# Patient Record
Sex: FEMALE | Race: WHITE | Employment: FULL TIME | URBAN - METROPOLITAN AREA
[De-identification: names, ages, dates, MRNs, and addresses within clinical notes are randomized per-mention and may not be internally consistent; named-entity substitution may affect disease eponyms.]

---

## 2024-05-09 ENCOUNTER — OFFICE VISIT (OUTPATIENT)
Age: 49
End: 2024-05-09
Payer: COMMERCIAL

## 2024-05-09 ENCOUNTER — HOSPITAL ENCOUNTER (OUTPATIENT)
Age: 49
Discharge: HOME | End: 2024-05-09
Payer: COMMERCIAL

## 2024-05-09 VITALS — HEIGHT: 65 IN | BODY MASS INDEX: 47.48 KG/M2 | WEIGHT: 285 LBS

## 2024-05-09 DIAGNOSIS — M25.562 LEFT KNEE PAIN, UNSPECIFIED CHRONICITY: ICD-10-CM

## 2024-05-09 DIAGNOSIS — M25.562 LEFT KNEE PAIN, UNSPECIFIED CHRONICITY: Primary | ICD-10-CM

## 2024-05-09 DIAGNOSIS — S83.282A ACUTE LATERAL MENISCUS TEAR OF LEFT KNEE, INITIAL ENCOUNTER: Primary | ICD-10-CM

## 2024-05-09 PROCEDURE — G8427 DOCREV CUR MEDS BY ELIG CLIN: HCPCS | Performed by: ORTHOPAEDIC SURGERY

## 2024-05-09 PROCEDURE — G8417 CALC BMI ABV UP PARAM F/U: HCPCS | Performed by: ORTHOPAEDIC SURGERY

## 2024-05-09 PROCEDURE — 99203 OFFICE O/P NEW LOW 30 MIN: CPT | Performed by: ORTHOPAEDIC SURGERY

## 2024-05-09 PROCEDURE — 4004F PT TOBACCO SCREEN RCVD TLK: CPT | Performed by: ORTHOPAEDIC SURGERY

## 2024-05-09 PROCEDURE — 73564 X-RAY EXAM KNEE 4 OR MORE: CPT

## 2024-05-09 RX ORDER — TRIAMTERENE AND HYDROCHLOROTHIAZIDE 37.5; 25 MG/1; MG/1
1 CAPSULE ORAL DAILY
COMMUNITY

## 2024-05-09 RX ORDER — MONTELUKAST SODIUM 10 MG/1
10 TABLET ORAL NIGHTLY
COMMUNITY

## 2024-05-09 RX ORDER — ASPIRIN 81 MG/1
81 TABLET ORAL DAILY
COMMUNITY

## 2024-05-09 RX ORDER — CETIRIZINE HYDROCHLORIDE 10 MG/1
10 TABLET ORAL DAILY
COMMUNITY

## 2024-05-09 RX ORDER — FAMOTIDINE 20 MG/1
20 TABLET, FILM COATED ORAL 2 TIMES DAILY
COMMUNITY

## 2024-05-09 RX ORDER — ACETAMINOPHEN AND CODEINE PHOSPHATE 120; 12 MG/5ML; MG/5ML
1 SOLUTION ORAL DAILY
COMMUNITY

## 2024-05-09 NOTE — PROGRESS NOTES
Date of Encounter: 2024  Patient Name:Yandy Garcia  Medical Record Number: [id number]    Chief Complaint   Patient presents with    New Patient     L knee pain       History of Present Illness:  Yandy Garcia is a 48 y.o. female here for evaluation of her left knee.  Her current symptoms are described below and I reviewed them with her today.  She has sudden onset sharp pain in the posterior lateral aspect of her left knee.  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  She twisted and felt a pain and a pop.  Since then she has had difficulty bending the knee.  She is not sure if she had any underlying swelling but despite use of ice and elevation as well as oral nonsteroidal anti-inflammatories she has had minimal improvement in her symptoms.  She can walk on level ground but as soon as she starts to try to go up or down a step or bend the knee to pushing the clutch on her corner she has severe pain.  She denies numbness or tingling that radiates down the leg.  No history of prior injury or surgery on this knee.    Pain Assessment  Location of Pain: Knee  Location Modifiers: Left  Quality of Pain: Popping  Duration of Pain: Persistent  Frequency of Pain: Constant  Aggravating Factors: Stairs    Past Medical History:   Diagnosis Date    Asthma     GERD (gastroesophageal reflux disease)     Hypertension        Past Surgical History:   Procedure Laterality Date     SECTION         Current Outpatient Medications   Medication Sig Dispense Refill    triamterene-hydroCHLOROthiazide (DYAZIDE) 37.5-25 MG per capsule Take 1 capsule by mouth daily      montelukast (SINGULAIR) 10 MG tablet Take 1 tablet by mouth nightly      norethindrone (MICRONOR) 0.35 MG tablet Take 1 tablet by mouth daily      famotidine (PEPCID) 20 MG tablet Take 1 tablet by mouth 2 times daily      aspirin 81 MG EC tablet Take 1 tablet by mouth daily      cetirizine (ZYRTEC) 10 MG tablet Take 1 tablet by

## 2024-05-14 ENCOUNTER — TELEPHONE (OUTPATIENT)
Age: 49
End: 2024-05-14

## 2024-05-14 ENCOUNTER — HOSPITAL ENCOUNTER (OUTPATIENT)
Age: 49
Discharge: HOME | End: 2024-05-14
Attending: ORTHOPAEDIC SURGERY
Payer: COMMERCIAL

## 2024-05-14 ENCOUNTER — CLINICAL DOCUMENTATION (OUTPATIENT)
Age: 49
End: 2024-05-14

## 2024-05-14 DIAGNOSIS — S83.282A ACUTE LATERAL MENISCUS TEAR OF LEFT KNEE, INITIAL ENCOUNTER: ICD-10-CM

## 2024-05-14 PROCEDURE — 73721 MRI JNT OF LWR EXTRE W/O DYE: CPT

## 2024-05-14 NOTE — TELEPHONE ENCOUNTER
I reviewed the patient's MRI images over the phone with her today.  We discussed the nature of her lateral meniscus tear as well as her underlying early degenerative changes in her knee.  She has been asymptomatic with respect to her anterior compartment chondrosis.  She continues to have mechanical symptoms related to the lateral meniscus tear with twisting and flexion of the knee  We reviewed treatment options including nonoperative intervention with anti-inflammatory medications, possible injection and physical therapy versus arthroscopic meniscus repair.  We discussed the potential for increase in tear size and symptoms without repair.  We reviewed risk of further arthritis in the future and timelines for potential need for total knee arthroplasty.  She knows these or not definitive timelines but only estimates based on clinical data.  She understands there is no guarantee with surgical intervention but this could help delay the need for total joint arthroplasty if meniscal repair is successful.  Reviewed the average recovery rates of meniscal repairs and the success rate of approximately 70% with good healing.  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  We discussed the potential for arthroscopic intervention next week and she will talk this over with her employer and spouse.  Her son has an 8th grade graduation next Tuesday so any surgery would be planned after that date.  [facility] paperwork will be filed.    MRI findings 5/14/24:  EXAM: MRI KNEE LEFT WO CONTRAST      INDICATION: Acute lateral meniscus tear of left knee     COMPARISON: Radiographs 5/9/2024     TECHNIQUE: Multiplanar T1 and T2 weighted MR sequences were obtained of the knee  without contrast.     FINDINGS:     MENISCI  Medial meniscus: Intact  Lateral meniscus: Abnormal signal and morphology of the posterior

## 2024-05-14 NOTE — PROGRESS NOTES
Patient came in today, spoke with patient about everything that needs to be done with Workers Comp - Will talk to patient about setting everything up for worker's comp. Uploaded and sent MEDCO-14 and First Date of Injury to Workers Comp.     Went over everything will Surgery for patient, Will Complete Prep for Proc once we hear back from [facility] about approval Hoping to have Surgery Done 05/22/24.

## 2024-05-16 ENCOUNTER — TELEPHONE (OUTPATIENT)
Age: 49
End: 2024-05-16

## 2024-05-16 NOTE — TELEPHONE ENCOUNTER
Patient Called, Patient informed us that she has a Workers Comp Claim number now Claim Number: [id number]     Dr. Rowley is still planning to do surgery on for this patient 05/22/24 given approval from [facility].    PLEASE ADVISE

## 2024-05-17 ENCOUNTER — TELEPHONE (OUTPATIENT)
Dept: ORTHOPEDIC SURGERY | Age: 49
End: 2024-05-17

## 2024-05-17 ENCOUNTER — TELEPHONE (OUTPATIENT)
Age: 49
End: 2024-05-17

## 2024-05-17 DIAGNOSIS — Z01.818 PRE-OP TESTING: Primary | ICD-10-CM

## 2024-05-17 DIAGNOSIS — Z01.818 PRE-OP TESTING: ICD-10-CM

## 2024-05-17 LAB
ANION GAP SERPL CALCULATED.3IONS-SCNC: 12 MMOL/L (ref 3–16)
BASOPHILS # BLD: 0.1 K/UL (ref 0–0.2)
BASOPHILS NFR BLD: 1.4 %
BUN SERPL-MCNC: 22 MG/DL (ref 7–20)
CALCIUM SERPL-MCNC: 9.5 MG/DL (ref 8.3–10.6)
CHLORIDE SERPL-SCNC: 102 MMOL/L (ref 99–110)
CO2 SERPL-SCNC: 26 MMOL/L (ref 21–32)
CREAT SERPL-MCNC: 0.8 MG/DL (ref 0.6–1.1)
DEPRECATED RDW RBC AUTO: 18 % (ref 12.4–15.4)
EOSINOPHIL # BLD: 0.2 K/UL (ref 0–0.6)
EOSINOPHIL NFR BLD: 2.6 %
GFR SERPLBLD CREATININE-BSD FMLA CKD-EPI: >90 ML/MIN/{1.73_M2}
GLUCOSE SERPL-MCNC: 105 MG/DL (ref 70–99)
HCT VFR BLD AUTO: 34.9 % (ref 36–48)
HGB BLD-MCNC: 11.4 G/DL (ref 12–16)
INR PPP: 0.96 (ref 0.85–1.15)
LYMPHOCYTES # BLD: 1.8 K/UL (ref 1–5.1)
LYMPHOCYTES NFR BLD: 21.3 %
MCH RBC QN AUTO: 23.5 PG (ref 26–34)
MCHC RBC AUTO-ENTMCNC: 32.6 G/DL (ref 31–36)
MCV RBC AUTO: 72.2 FL (ref 80–100)
MONOCYTES # BLD: 0.4 K/UL (ref 0–1.3)
MONOCYTES NFR BLD: 4.7 %
NEUTROPHILS # BLD: 6 K/UL (ref 1.7–7.7)
NEUTROPHILS NFR BLD: 70 %
PLATELET # BLD AUTO: 331 K/UL (ref 135–450)
PMV BLD AUTO: 8.7 FL (ref 5–10.5)
POTASSIUM SERPL-SCNC: 4.2 MMOL/L (ref 3.5–5.1)
PROTHROMBIN TIME: 13 SEC (ref 11.9–14.9)
RBC # BLD AUTO: 4.83 M/UL (ref 4–5.2)
SODIUM SERPL-SCNC: 140 MMOL/L (ref 136–145)
WBC # BLD AUTO: 8.6 K/UL (ref 4–11)

## 2024-05-17 NOTE — TELEPHONE ENCOUNTER
Patient is wanting to know where she can go to complete physical, her primary care does not do worker comp. Please advise.

## 2024-05-17 NOTE — TELEPHONE ENCOUNTER
I called and informed her that she can get her physical at an urgent care facility. Pt verbalized understanding.

## 2024-05-17 NOTE — TELEPHONE ENCOUNTER
Called Ria with workers comp, got more information on BWC and surgeries.     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

## 2024-05-21 ENCOUNTER — TELEPHONE (OUTPATIENT)
Age: 49
End: 2024-05-21

## 2024-05-21 NOTE — TELEPHONE ENCOUNTER
I spoke with pt and she is aware that we are still waiting for ok for surgery.  Yandy stated that she did place a call into them also. She is still waiting on a call back for them. I told her that we will call as soon as we hear something.   Pt understand and verbalized understanding

## 2024-05-24 NOTE — TELEPHONE ENCOUNTER
Called patient, spoke with patient about no information on Workers comp yet. Will update her as we get more information for workers comp.

## 2024-05-29 ENCOUNTER — ANESTHESIA EVENT (OUTPATIENT)
Age: 49
End: 2024-05-29
Payer: COMMERCIAL

## 2024-05-29 ENCOUNTER — TELEPHONE (OUTPATIENT)
Age: 49
End: 2024-05-29

## 2024-05-29 ENCOUNTER — PREP FOR PROCEDURE (OUTPATIENT)
Age: 49
End: 2024-05-29

## 2024-05-29 PROBLEM — S83.282A ACUTE LATERAL MENISCUS TEAR OF LEFT KNEE: Status: ACTIVE | Noted: 2024-05-29

## 2024-05-29 RX ORDER — SODIUM CHLORIDE 9 MG/ML
INJECTION, SOLUTION INTRAVENOUS CONTINUOUS
Status: CANCELLED | OUTPATIENT
Start: 2024-05-29

## 2024-05-29 RX ORDER — SODIUM CHLORIDE 0.9 % (FLUSH) 0.9 %
5-40 SYRINGE (ML) INJECTION PRN
Status: CANCELLED | OUTPATIENT
Start: 2024-05-29

## 2024-05-29 RX ORDER — SODIUM CHLORIDE 0.9 % (FLUSH) 0.9 %
5-40 SYRINGE (ML) INJECTION EVERY 12 HOURS SCHEDULED
Status: CANCELLED | OUTPATIENT
Start: 2024-05-29

## 2024-05-29 RX ORDER — SODIUM CHLORIDE 9 MG/ML
INJECTION, SOLUTION INTRAVENOUS PRN
Status: CANCELLED | OUTPATIENT
Start: 2024-05-29

## 2024-05-29 RX ORDER — ALBUTEROL SULFATE 90 UG/1
AEROSOL, METERED RESPIRATORY (INHALATION)
COMMUNITY
Start: 2007-11-26

## 2024-05-29 RX ORDER — TRANEXAMIC ACID 650 MG/1
1950 TABLET ORAL
Status: CANCELLED | OUTPATIENT
Start: 2024-05-29

## 2024-05-29 RX ORDER — MELOXICAM 7.5 MG/1
7.5 TABLET ORAL ONCE
Status: CANCELLED | OUTPATIENT
Start: 2024-05-29 | End: 2024-05-29

## 2024-05-29 RX ORDER — ACETAMINOPHEN 325 MG/1
1000 TABLET ORAL ONCE
Status: CANCELLED | OUTPATIENT
Start: 2024-05-29 | End: 2024-05-29

## 2024-05-29 RX ORDER — UBIDECARENONE 75 MG
50 CAPSULE ORAL PRN
COMMUNITY

## 2024-05-29 NOTE — PROGRESS NOTES
[facility] PRE-OPERATIVE INSTRUCTIONS       DOS: __5/30/2024__        Pre-Op Instructions     Patients receiving local anesthetic only will arrive one hour prior to the procedure, all other patients will arrive 1.5 hours prior to procedure time.    [x]  A History and Physical will be required within 30 days prior to surgery date. Some patients may require cardiac or pulmonary clearance. H&P will be completed DOS for Endo/colonoscopy patients.     [x]  Reviewed Medical and Surgical history, medication list, confirmed with patient any implants, allergies, bleeding disorders, SHRUTI and reactions to Anesthesia.    [x]  If there is a change in physical condition between now and the day of surgery, please notify your surgeon. This includes a cough, cold, fever, sore throat, nausea, vomiting and diarrhea. Also notify your surgeon if you experience dizziness, shortness of breath or blurred vision.    [x] Reviewed hx of C-Diff, MRSA, VRE and/or recent use of Antibiotics     [x]  All patients having a procedure must have a ride home by a responsible person that is over the age of 18 and ensure it is someone that we can share medical information with. After discharge, a responsible adult needs to stay with you for 24 hours. There is a limit of 2 adult visitors per room.     If unable to secure ride and/or care taker, please contact surgeon's office.      [x]  No alcohol, smoking or marijuana use 24 hours prior to surgery. Any use of recreational drugs must be stopped 5 days prior to surgery.     [x]  NPO after midnight (Any heart, BP, seizure, thyroid and breathing medications are okay to take the morning of surgery with a small sip of water 4 hours prior to procedure).    The morning of surgery, you may brush your teeth, just no swallowing water. Also, NO gum, candy, mints or ice chips.    []  For Colonoscopy's, follow prep-instructions as indicated by physician.     []  Patients with a insulin pump, keep set on

## 2024-05-29 NOTE — TELEPHONE ENCOUNTER
Called patient, spoke with patient, She is good to go for surgery. Will submit everything for surgery for tomrrow 05/30/24.

## 2024-05-30 ENCOUNTER — HOSPITAL ENCOUNTER (OUTPATIENT)
Age: 49
Setting detail: OUTPATIENT SURGERY
Discharge: HOME | End: 2024-05-30
Attending: ORTHOPAEDIC SURGERY | Admitting: ORTHOPAEDIC SURGERY
Payer: COMMERCIAL

## 2024-05-30 ENCOUNTER — ANESTHESIA (OUTPATIENT)
Age: 49
End: 2024-05-30
Payer: COMMERCIAL

## 2024-05-30 ENCOUNTER — TELEPHONE (OUTPATIENT)
Age: 49
End: 2024-05-30

## 2024-05-30 VITALS
SYSTOLIC BLOOD PRESSURE: 156 MMHG | WEIGHT: 285 LBS | HEIGHT: 65 IN | DIASTOLIC BLOOD PRESSURE: 87 MMHG | HEART RATE: 88 BPM | OXYGEN SATURATION: 98 % | BODY MASS INDEX: 47.48 KG/M2 | RESPIRATION RATE: 18 BRPM | TEMPERATURE: 97.7 F

## 2024-05-30 DIAGNOSIS — S83.282D ACUTE LATERAL MENISCUS TEAR OF LEFT KNEE, SUBSEQUENT ENCOUNTER: Primary | ICD-10-CM

## 2024-05-30 PROBLEM — S83.242A ACUTE MEDIAL MENISCUS TEAR OF LEFT KNEE: Status: ACTIVE | Noted: 2024-05-30

## 2024-05-30 LAB — HCG, PREGNANCY URINE (POC): NEGATIVE

## 2024-05-30 PROCEDURE — 3700000001 HC ADD 15 MINUTES (ANESTHESIA): Performed by: ORTHOPAEDIC SURGERY

## 2024-05-30 PROCEDURE — 6360000002 HC RX W HCPCS: Performed by: ORTHOPAEDIC SURGERY

## 2024-05-30 PROCEDURE — 81025 URINE PREGNANCY TEST: CPT

## 2024-05-30 PROCEDURE — 2500000003 HC RX 250 WO HCPCS: Performed by: NURSE ANESTHETIST, CERTIFIED REGISTERED

## 2024-05-30 PROCEDURE — 6360000002 HC RX W HCPCS: Performed by: NURSE ANESTHETIST, CERTIFIED REGISTERED

## 2024-05-30 PROCEDURE — 2720000010 HC SURG SUPPLY STERILE: Performed by: ORTHOPAEDIC SURGERY

## 2024-05-30 PROCEDURE — 2709999900 HC NON-CHARGEABLE SUPPLY: Performed by: ORTHOPAEDIC SURGERY

## 2024-05-30 PROCEDURE — 6370000000 HC RX 637 (ALT 250 FOR IP): Performed by: ANESTHESIOLOGY

## 2024-05-30 PROCEDURE — 2580000003 HC RX 258: Performed by: NURSE ANESTHETIST, CERTIFIED REGISTERED

## 2024-05-30 PROCEDURE — 7100000001 HC PACU RECOVERY - ADDTL 15 MIN: Performed by: ORTHOPAEDIC SURGERY

## 2024-05-30 PROCEDURE — 7100000000 HC PACU RECOVERY - FIRST 15 MIN: Performed by: ORTHOPAEDIC SURGERY

## 2024-05-30 PROCEDURE — 2580000003 HC RX 258: Performed by: ORTHOPAEDIC SURGERY

## 2024-05-30 PROCEDURE — 7100000010 HC PHASE II RECOVERY - FIRST 15 MIN: Performed by: ORTHOPAEDIC SURGERY

## 2024-05-30 PROCEDURE — 3600000014 HC SURGERY LEVEL 4 ADDTL 15MIN: Performed by: ORTHOPAEDIC SURGERY

## 2024-05-30 PROCEDURE — A4217 STERILE WATER/SALINE, 500 ML: HCPCS | Performed by: ORTHOPAEDIC SURGERY

## 2024-05-30 PROCEDURE — 6370000000 HC RX 637 (ALT 250 FOR IP): Performed by: ORTHOPAEDIC SURGERY

## 2024-05-30 PROCEDURE — 94640 AIRWAY INHALATION TREATMENT: CPT

## 2024-05-30 PROCEDURE — 3700000000 HC ANESTHESIA ATTENDED CARE: Performed by: ORTHOPAEDIC SURGERY

## 2024-05-30 PROCEDURE — 7100000011 HC PHASE II RECOVERY - ADDTL 15 MIN: Performed by: ORTHOPAEDIC SURGERY

## 2024-05-30 PROCEDURE — 3600000004 HC SURGERY LEVEL 4 BASE: Performed by: ORTHOPAEDIC SURGERY

## 2024-05-30 RX ORDER — OXYCODONE HYDROCHLORIDE 5 MG/1
5 TABLET ORAL
Status: DISCONTINUED | OUTPATIENT
Start: 2024-05-30 | End: 2024-05-30 | Stop reason: HOSPADM

## 2024-05-30 RX ORDER — ONDANSETRON 2 MG/ML
INJECTION INTRAMUSCULAR; INTRAVENOUS PRN
Status: DISCONTINUED | OUTPATIENT
Start: 2024-05-30 | End: 2024-05-30 | Stop reason: SDUPTHER

## 2024-05-30 RX ORDER — ASPIRIN 81 MG/1
81 TABLET ORAL 2 TIMES DAILY
Qty: 30 TABLET | Refills: 0
Start: 2024-05-30 | End: 2024-06-14

## 2024-05-30 RX ORDER — SUCCINYLCHOLINE/SOD CL,ISO/PF 200MG/10ML
SYRINGE (ML) INTRAVENOUS PRN
Status: DISCONTINUED | OUTPATIENT
Start: 2024-05-30 | End: 2024-05-30 | Stop reason: SDUPTHER

## 2024-05-30 RX ORDER — SODIUM CHLORIDE 9 MG/ML
INJECTION, SOLUTION INTRAVENOUS PRN
Status: DISCONTINUED | OUTPATIENT
Start: 2024-05-30 | End: 2024-05-30 | Stop reason: HOSPADM

## 2024-05-30 RX ORDER — DROPERIDOL 2.5 MG/ML
0.62 INJECTION, SOLUTION INTRAMUSCULAR; INTRAVENOUS
Status: DISCONTINUED | OUTPATIENT
Start: 2024-05-30 | End: 2024-05-30 | Stop reason: HOSPADM

## 2024-05-30 RX ORDER — TRANEXAMIC ACID 650 MG/1
1950 TABLET ORAL
Status: DISCONTINUED | OUTPATIENT
Start: 2024-05-30 | End: 2024-05-30 | Stop reason: HOSPADM

## 2024-05-30 RX ORDER — ONDANSETRON 2 MG/ML
4 INJECTION INTRAMUSCULAR; INTRAVENOUS
Status: DISCONTINUED | OUTPATIENT
Start: 2024-05-30 | End: 2024-05-30 | Stop reason: HOSPADM

## 2024-05-30 RX ORDER — DEXAMETHASONE SODIUM PHOSPHATE 10 MG/ML
8 INJECTION, SOLUTION INTRAMUSCULAR; INTRAVENOUS ONCE
Status: COMPLETED | OUTPATIENT
Start: 2024-05-30 | End: 2024-05-30

## 2024-05-30 RX ORDER — MIDAZOLAM HYDROCHLORIDE 1 MG/ML
INJECTION INTRAMUSCULAR; INTRAVENOUS PRN
Status: DISCONTINUED | OUTPATIENT
Start: 2024-05-30 | End: 2024-05-30 | Stop reason: SDUPTHER

## 2024-05-30 RX ORDER — DEXAMETHASONE SODIUM PHOSPHATE 4 MG/ML
INJECTION, SOLUTION INTRA-ARTICULAR; INTRALESIONAL; INTRAMUSCULAR; INTRAVENOUS; SOFT TISSUE PRN
Status: DISCONTINUED | OUTPATIENT
Start: 2024-05-30 | End: 2024-05-30 | Stop reason: SDUPTHER

## 2024-05-30 RX ORDER — NALOXONE HYDROCHLORIDE 0.4 MG/ML
INJECTION, SOLUTION INTRAMUSCULAR; INTRAVENOUS; SUBCUTANEOUS PRN
Status: DISCONTINUED | OUTPATIENT
Start: 2024-05-30 | End: 2024-05-30 | Stop reason: HOSPADM

## 2024-05-30 RX ORDER — SODIUM CHLORIDE 0.9 % (FLUSH) 0.9 %
5-40 SYRINGE (ML) INJECTION PRN
Status: DISCONTINUED | OUTPATIENT
Start: 2024-05-30 | End: 2024-05-30 | Stop reason: HOSPADM

## 2024-05-30 RX ORDER — ACETAMINOPHEN 500 MG
1000 TABLET ORAL ONCE
Status: COMPLETED | OUTPATIENT
Start: 2024-05-30 | End: 2024-05-30

## 2024-05-30 RX ORDER — SODIUM CHLORIDE 0.9 % (FLUSH) 0.9 %
5-40 SYRINGE (ML) INJECTION EVERY 12 HOURS SCHEDULED
Status: DISCONTINUED | OUTPATIENT
Start: 2024-05-30 | End: 2024-05-30 | Stop reason: HOSPADM

## 2024-05-30 RX ORDER — MEPERIDINE HYDROCHLORIDE 25 MG/ML
12.5 INJECTION INTRAMUSCULAR; INTRAVENOUS; SUBCUTANEOUS EVERY 5 MIN PRN
Status: DISCONTINUED | OUTPATIENT
Start: 2024-05-30 | End: 2024-05-30 | Stop reason: HOSPADM

## 2024-05-30 RX ORDER — IPRATROPIUM BROMIDE AND ALBUTEROL SULFATE 2.5; .5 MG/3ML; MG/3ML
1 SOLUTION RESPIRATORY (INHALATION)
Status: DISCONTINUED | OUTPATIENT
Start: 2024-05-30 | End: 2024-05-30

## 2024-05-30 RX ORDER — HYDROCODONE BITARTRATE AND ACETAMINOPHEN 5; 325 MG/1; MG/1
1 TABLET ORAL EVERY 6 HOURS PRN
Qty: 20 TABLET | Refills: 0 | Status: SHIPPED | OUTPATIENT
Start: 2024-05-30 | End: 2024-06-04

## 2024-05-30 RX ORDER — PROMETHAZINE HYDROCHLORIDE 25 MG/1
25 TABLET ORAL EVERY 6 HOURS PRN
Qty: 15 TABLET | Refills: 0 | Status: SHIPPED | OUTPATIENT
Start: 2024-05-30 | End: 2024-06-06

## 2024-05-30 RX ORDER — SODIUM CHLORIDE 9 MG/ML
INJECTION, SOLUTION INTRAVENOUS CONTINUOUS PRN
Status: DISCONTINUED | OUTPATIENT
Start: 2024-05-30 | End: 2024-05-30 | Stop reason: SDUPTHER

## 2024-05-30 RX ORDER — SODIUM CHLORIDE 9 MG/ML
INJECTION, SOLUTION INTRAVENOUS CONTINUOUS
Status: DISCONTINUED | OUTPATIENT
Start: 2024-05-30 | End: 2024-05-30 | Stop reason: HOSPADM

## 2024-05-30 RX ORDER — FENTANYL CITRATE 50 UG/ML
INJECTION, SOLUTION INTRAMUSCULAR; INTRAVENOUS PRN
Status: DISCONTINUED | OUTPATIENT
Start: 2024-05-30 | End: 2024-05-30 | Stop reason: SDUPTHER

## 2024-05-30 RX ORDER — MAGNESIUM HYDROXIDE 1200 MG/15ML
LIQUID ORAL CONTINUOUS PRN
Status: COMPLETED | OUTPATIENT
Start: 2024-05-30 | End: 2024-05-30

## 2024-05-30 RX ORDER — BUPIVACAINE HYDROCHLORIDE 2.5 MG/ML
INJECTION, SOLUTION EPIDURAL; INFILTRATION; INTRACAUDAL PRN
Status: DISCONTINUED | OUTPATIENT
Start: 2024-05-30 | End: 2024-05-30 | Stop reason: ALTCHOICE

## 2024-05-30 RX ORDER — FENTANYL CITRATE 50 UG/ML
25 INJECTION, SOLUTION INTRAMUSCULAR; INTRAVENOUS EVERY 5 MIN PRN
Status: DISCONTINUED | OUTPATIENT
Start: 2024-05-30 | End: 2024-05-30 | Stop reason: HOSPADM

## 2024-05-30 RX ORDER — MELOXICAM 7.5 MG/1
7.5 TABLET ORAL ONCE
Status: COMPLETED | OUTPATIENT
Start: 2024-05-30 | End: 2024-05-30

## 2024-05-30 RX ORDER — PROPOFOL 10 MG/ML
INJECTION, EMULSION INTRAVENOUS PRN
Status: DISCONTINUED | OUTPATIENT
Start: 2024-05-30 | End: 2024-05-30 | Stop reason: SDUPTHER

## 2024-05-30 RX ORDER — FAMOTIDINE 10 MG/ML
INJECTION, SOLUTION INTRAVENOUS PRN
Status: DISCONTINUED | OUTPATIENT
Start: 2024-05-30 | End: 2024-05-30 | Stop reason: SDUPTHER

## 2024-05-30 RX ORDER — LIDOCAINE HYDROCHLORIDE 20 MG/ML
INJECTION, SOLUTION INTRAVENOUS PRN
Status: DISCONTINUED | OUTPATIENT
Start: 2024-05-30 | End: 2024-05-30 | Stop reason: SDUPTHER

## 2024-05-30 RX ORDER — ROCURONIUM BROMIDE 10 MG/ML
INJECTION, SOLUTION INTRAVENOUS PRN
Status: DISCONTINUED | OUTPATIENT
Start: 2024-05-30 | End: 2024-05-30 | Stop reason: SDUPTHER

## 2024-05-30 RX ORDER — IPRATROPIUM BROMIDE AND ALBUTEROL SULFATE 2.5; .5 MG/3ML; MG/3ML
1 SOLUTION RESPIRATORY (INHALATION) EVERY 4 HOURS PRN
Status: DISCONTINUED | OUTPATIENT
Start: 2024-05-30 | End: 2024-05-30 | Stop reason: HOSPADM

## 2024-05-30 RX ADMIN — Medication 160 MG: at 07:36

## 2024-05-30 RX ADMIN — MELOXICAM 7.5 MG: 7.5 TABLET ORAL at 06:45

## 2024-05-30 RX ADMIN — ACETAMINOPHEN 1000 MG: 500 TABLET ORAL at 06:44

## 2024-05-30 RX ADMIN — PROPOFOL 200 MG: 10 INJECTION, EMULSION INTRAVENOUS at 07:36

## 2024-05-30 RX ADMIN — SODIUM CHLORIDE: 9 INJECTION, SOLUTION INTRAVENOUS at 07:29

## 2024-05-30 RX ADMIN — FAMOTIDINE 20 MG: 10 INJECTION, SOLUTION INTRAVENOUS at 07:45

## 2024-05-30 RX ADMIN — ROCURONIUM BROMIDE 10 MG: 10 INJECTION, SOLUTION INTRAVENOUS at 07:36

## 2024-05-30 RX ADMIN — TRANEXAMIC ACID 1950 MG: 650 TABLET ORAL at 06:45

## 2024-05-30 RX ADMIN — MIDAZOLAM 2 MG: 1 INJECTION INTRAMUSCULAR; INTRAVENOUS at 07:30

## 2024-05-30 RX ADMIN — HYDROMORPHONE HYDROCHLORIDE 1 MG: 1 INJECTION, SOLUTION INTRAMUSCULAR; INTRAVENOUS; SUBCUTANEOUS at 08:34

## 2024-05-30 RX ADMIN — FENTANYL CITRATE 100 MCG: 50 INJECTION INTRAMUSCULAR; INTRAVENOUS at 07:33

## 2024-05-30 RX ADMIN — DEXTROSE MONOHYDRATE 3000 MG: 5 INJECTION INTRAVENOUS at 07:39

## 2024-05-30 RX ADMIN — IPRATROPIUM BROMIDE AND ALBUTEROL SULFATE 1 DOSE: 2.5; .5 SOLUTION RESPIRATORY (INHALATION) at 09:23

## 2024-05-30 RX ADMIN — ROCURONIUM BROMIDE 40 MG: 10 INJECTION, SOLUTION INTRAVENOUS at 07:39

## 2024-05-30 RX ADMIN — DEXAMETHASONE SODIUM PHOSPHATE 10 MG: 4 INJECTION, SOLUTION INTRA-ARTICULAR; INTRALESIONAL; INTRAMUSCULAR; INTRAVENOUS; SOFT TISSUE at 07:45

## 2024-05-30 RX ADMIN — SODIUM CHLORIDE: 9 INJECTION, SOLUTION INTRAVENOUS at 06:50

## 2024-05-30 RX ADMIN — DEXAMETHASONE SODIUM PHOSPHATE 8 MG: 10 INJECTION, SOLUTION INTRAMUSCULAR; INTRAVENOUS at 06:45

## 2024-05-30 RX ADMIN — LIDOCAINE HYDROCHLORIDE 100 MG: 20 INJECTION, SOLUTION INTRAVENOUS at 07:36

## 2024-05-30 RX ADMIN — SUGAMMADEX 400 MG: 100 INJECTION, SOLUTION INTRAVENOUS at 08:31

## 2024-05-30 RX ADMIN — ONDANSETRON 4 MG: 2 INJECTION INTRAMUSCULAR; INTRAVENOUS at 07:45

## 2024-05-30 RX ADMIN — PROPOFOL 100 MG: 10 INJECTION, EMULSION INTRAVENOUS at 07:41

## 2024-05-30 ASSESSMENT — PAIN - FUNCTIONAL ASSESSMENT: PAIN_FUNCTIONAL_ASSESSMENT: 0-10

## 2024-05-30 NOTE — PROGRESS NOTES
Pt received from OR to PACU # 8.     Post: Procedure(s):  LEFT PARTIAL LATERAL MENISCETOMY ARTHROSCOPIC    Left leg with ACE wrap clean, dry and intact. Distal pedal pulses strong.      Report received from CRNA and OR RN.    Pt is not fully wakeful from anesthesia although arousable to voice. Pt opening eyes and oral airway removed with CRNA at bedside.    Attached to PACU monitoring system. Alarms and parameters set    Denies pain and no complaints of nausea at this time.

## 2024-05-30 NOTE — OP NOTE
Operative Note      Patient: Yandy Garcia  YOB: 1975  MRN: [id number]    Date of Procedure: 5/30/2024    Pre-Op Diagnosis Codes:     * Acute lateral meniscus tear of left knee [S83.282A]  Acute medial meniscus tear of anterior horn left knee     Post-Op Diagnosis: Same       Procedure(s):  LEFT PARTIAL LATERAL MENISCETOMY ARTHROSCOPIC  Left partial medial meniscectomy arthorscopic   Surgeon(s):  Stefano Rowley MD    Assistant:   Surgical Assistant: Christel Chanel    Anesthesia: General    Estimated Blood Loss (mL): Minimal    Complications: None    Specimens:   * No specimens in log *    Implants:  * No implants in log *      Drains: * No LDAs found *    Findings:  Infection Present At Time Of Surgery (PATOS) (choose all levels that have infection present):  No infection present  Other Findings: 50% partial thickness tear of the posterior root of the lateral meniscus, horizontal cleavage tear of the anterior horn of the medial meniscus affecting the superior limb.  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  Intact ACL and PCL.    Condition:  Stable    Weight Bearing Status:  Weight bearing as tolerated    Activity:  Activity as tolerated (Patient may move about with assist as indicated or with supervision.)    Operative Report:  Indications: Yandy Garcia is a 49 y.o. female with history of left knee symptomatic lateral meniscal tear from a work related injury.  This has been confirmed by MRI and her symptoms have note resolved with conservative treatment.  The option of arthroscopic intervention has been presented and she has elected to proceed.  I have reviewed with her the risk, benefits, and potential outcomes / complications and she is prepared to proceed.  Her consent was obtained and all questions answered to her

## 2024-05-30 NOTE — ANESTHESIA PRE PROCEDURE
Cardiovascular:  Exercise tolerance: good (>4 METS)  (+) hypertension:    (-) past MI, CAD,  angina and  BOYD      Rhythm: regular  Rate: normal                 ROS comment: TTE 2018:  Study Conclusions     - Left ventricle: The cavity size was normal. There was mild     concentric hypertrophy. The estimated ejection fraction was in     the range of 60% to 65%. Wall motion was normal; there were no     regional wall motion abnormalities. Diastolic dysfunction:     Indeterminant. The global longitudinal strain was -23%.   - Mitral valve: The annulus was mildly calcified. The leaflets were     mildly thickened.   - Left atrium: The atrium was mildly to moderately dilated.   - Inferior vena cava: The vessel was normal in size; the     respirophasic diameter changes were blunted (< 50%); findings are     consistent with mildly elevated central venous pressure.        Neuro/Psych:   Negative Neuro/Psych ROS              GI/Hepatic/Renal:   (+) GERD:, morbid obesity     (-) liver disease and no renal disease       Endo/Other:                      ROS comment: Goiter 1 x 0.7 x 0.7 per ultrasound in 2018 Abdominal:             Vascular: negative vascular ROS.         Other Findings: Palpable small right thyroid mass            Anesthesia Plan      general     ASA 3     (Asthma is intermittent and states more allergic/seasonal and uses singulair daily. Denies heartburn or nausea this AM. 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 Goiter does not appear to be significant. It is palpable but does not cause issues per patient.)  Induction: intravenous.    MIPS: Postoperative opioids intended, Prophylactic antiemetics administered and Postoperative trial extubation.  Anesthetic plan and risks discussed with patient.      Plan discussed with CRNA.                    Bhavesh Johnson MD   5/30/2024

## 2024-05-30 NOTE — BRIEF OP NOTE
Brief Postoperative Note      Patient: Yandy Garcia  YOB: 1975  MRN: [id number]    Date of Procedure: 5/30/2024    Pre-Op Diagnosis Codes:     * Acute lateral meniscus tear of left knee [S83.282A]  Acute medial meniscus tear of anterior horn left knee     Post-Op Diagnosis: Same       Procedure(s):  LEFT PARTIAL LATERAL MENISCETOMY ARTHROSCOPIC  Left partial medial meniscectomy arthorscopic    Surgeon(s):  Kurt Neff MD    Assistant:  Surgical Assistant: Christel Chanel    Anesthesia: General    Estimated Blood Loss (mL): Minimal    Complications: None    Specimens:   * No specimens in log *    Implants:  * No implants in log *      Drains: * No LDAs found *    Findings:  Infection Present At Time Of Surgery (PATOS) (choose all levels that have infection present):  No infection present  Other Findings: 50% partial thickness tear of the posterior root of the lateral meniscus, horizontal cleavage tear of the anterior horn of the medial meniscus affecting the superior limb.  Diffuse grade III chondromalacia of the trochlea and patella, grade III chondromalacia medial femoral condyle weightbearing aspect and lateral aspect of the weightbearing portion of the lateral femoral condyle.  Intact ACL and PCL.    Electronically signed by KURT NEFF MD on 5/30/2024 at 8:50 AM

## 2024-05-30 NOTE — PROGRESS NOTES
Ambulatory Surgery/Procedure Discharge Note    Vitals:    05/30/24 0945   BP: (!) 156/87   Pulse: 88   Resp: 18   Temp: 97.7 °F (36.5 °C)   SpO2: 98%       In: 1000 [I.V.:900]  Out: 20     Pain assessment:  none  Pain Level: 0      Pt states \"ready to go home\". Pt alert and oriented x4. IV removed. Denies N/V, tolerating PO intake Discharge instructions given to pt and  , verbalized understanding of all instructions. Left with all belongings and discharge instructions.   Patient discharged to home/self care. Patient discharged via wheel chair by RN to waiting family.       5/30/2024 10:17 AM

## 2024-05-30 NOTE — H&P
Patient seen and examined.  I have reviewed the history and physical scanned in from 5/23/2024 and examined the patient and find no relevant changes.  Surgery plan reviewed.    Site marked and consent verified.  All questions answered and antibiotic verified.    Ready for left knee arthroscopy with lateral meniscus repair.      Stefano Rowley MD, FAAOS  Board Certified Orthopaedic Surgeon  [facility]  [place] & [place]    Phone:[phone]  Fax [phone]    05/30/24  7:13 AM

## 2024-05-30 NOTE — DISCHARGE INSTRUCTIONS
machinery while taking narcotics.  Females of childbearing potential and on hormonal birth control, should use two forms of contraception following procedure if given a medication called sugammadex and/or emend. Additional contraception should be used for 7 days for sugammadex and/or 28 days for emend. These medications have a potential to reduce the effectiveness of hormonal birth control.

## 2024-05-30 NOTE — ANESTHESIA POSTPROCEDURE EVALUATION
Department of Anesthesiology  Postprocedure Note    Patient: Yandy Garcia  MRN: [id number]  YOB: 1975  Date of evaluation: 5/30/2024    Procedure Summary       Date: 05/30/24 Room / Location: [address]    Anesthesia Start: 0730 Anesthesia Stop: 0901    Procedure: LEFT PARTIAL LATERAL MENISCETOMY ARTHROSCOPIC (Left: Knee) Diagnosis:       Acute lateral meniscus tear of left knee      (Acute lateral meniscus tear of left knee [S83.282A])    Surgeons: Stefano Rowley MD Responsible Provider: Bhavesh Johnson MD    Anesthesia Type: general ASA Status: 3            Anesthesia Type: No value filed.    John Phase I: John Score: 10    John Phase II: John Score: 10    Anesthesia Post Evaluation    Patient location during evaluation: PACU  Patient participation: complete - patient participated  Level of consciousness: awake  Airway patency: patent  Nausea & Vomiting: no nausea and no vomiting  Cardiovascular status: blood pressure returned to baseline  Respiratory status: acceptable  Hydration status: stable  Comments: Vital signs stable  OK to discharge from Stage I post anesthesia care.  Care transferred from Anesthesiology department on discharge from perioperative area   Multimodal analgesia pain management approach  Pain management: satisfactory to patient    No notable events documented.

## 2024-06-04 ENCOUNTER — TELEPHONE (OUTPATIENT)
Age: 49
End: 2024-06-04

## 2024-06-04 NOTE — TELEPHONE ENCOUNTER
MEDCO14 / WORKABILITY:    Caller: GERARDO AT [facility]    Phone#: [phone]    Fax#: [phone]    MEDCO/WORKABILITY Date of Service:      REASON FOR CALL:         Requesting an updated Medco 14,  medco from 5- is expiring today 6-4-2024

## 2024-06-13 ENCOUNTER — OFFICE VISIT (OUTPATIENT)
Age: 49
End: 2024-06-13

## 2024-06-13 VITALS — WEIGHT: 285 LBS | BODY MASS INDEX: 47.48 KG/M2 | HEIGHT: 65 IN

## 2024-06-13 DIAGNOSIS — S83.282D ACUTE LATERAL MENISCUS TEAR OF LEFT KNEE, SUBSEQUENT ENCOUNTER: Primary | ICD-10-CM

## 2024-06-14 ENCOUNTER — TELEPHONE (OUTPATIENT)
Dept: ORTHOPEDIC SURGERY | Age: 49
End: 2024-06-14

## 2024-06-14 NOTE — PROGRESS NOTES
from surgery.  She will follow-up in 3 weeks to check her progress.    She understands and accepts this course of care.      Stefano Rowley MD, FAAOS  Board Certified Orthopaedic Surgeon  [facility]  [place] & [place]    Phone:[phone]  Fax [phone]    06/14/24  12:10 PM    Documentation was done using voice recognition dragon software.  Every effort was made to ensure accuracy; however, inadvertent  Unintentional computerized transcription errors may be present.

## 2024-06-14 NOTE — TELEPHONE ENCOUNTER
General Question     Subject: Pt REQUESTING C-9 TO START PT  Patient and /or Facility Request: Yandy Garcia   Contact Number: [phone]      ASKING FOR MAX TO CALL HER BACK ABOUT GETTING THE C9 SO SHE CAN START PT.

## 2024-06-18 ENCOUNTER — HOSPITAL ENCOUNTER (OUTPATIENT)
Dept: PHYSICAL THERAPY | Age: 49
Setting detail: THERAPIES SERIES
Discharge: HOME | End: 2024-06-18
Payer: COMMERCIAL

## 2024-06-18 DIAGNOSIS — M62.81 QUADRICEPS WEAKNESS: ICD-10-CM

## 2024-06-18 DIAGNOSIS — M25.462 EFFUSION OF LEFT KNEE: ICD-10-CM

## 2024-06-18 DIAGNOSIS — G89.18 ACUTE POSTOPERATIVE PAIN OF LEFT KNEE: Primary | ICD-10-CM

## 2024-06-18 DIAGNOSIS — R26.2 DIFFICULTY WALKING: ICD-10-CM

## 2024-06-18 DIAGNOSIS — M25.562 ACUTE POSTOPERATIVE PAIN OF LEFT KNEE: Primary | ICD-10-CM

## 2024-06-18 PROCEDURE — 97110 THERAPEUTIC EXERCISES: CPT | Performed by: PHYSICAL THERAPIST

## 2024-06-18 PROCEDURE — 97161 PT EVAL LOW COMPLEX 20 MIN: CPT | Performed by: PHYSICAL THERAPIST

## 2024-06-18 PROCEDURE — 97016 VASOPNEUMATIC DEVICE THERAPY: CPT | Performed by: PHYSICAL THERAPIST

## 2024-06-18 NOTE — PLAN OF CARE
symptoms or restriction.   [] Progressing: [] Met: [] Not Met: [] Adjusted  5. Patient will return to full duty as a  without increased symptoms or restriction. (Requires carrying 60+# of gear and climbing 125ft ladder)  [] Progressing: [] Met: [] Not Met: [] Adjusted     Overall Progression Towards Functional goals/ Treatment Progress Update:  [] Patient is progressing as expected towards functional goals listed.    [] Progression is slowed due to complexities/Impairments listed.  [] Progression has been slowed due to co-morbidities.  [x] Plan just implemented, too soon (<30days) to assess goals progression   [] Goals require adjustment due to lack of progress  [] Patient is not progressing as expected and requires additional follow up with physician  [] Other:     TREATMENT PLAN     Frequency/Duration: 2-3x/week for 12 weeks for the following treatment interventions:    Interventions:  Therapeutic Exercise (44453) including: strength training, ROM, and functional mobility  Therapeutic Activities (65449) including: functional mobility training and education.  Neuromuscular Re-education (28198) activation and proprioception, including postural re-education.    Gait Training (36899) for normalization of ambulation patterns and AD training.   Manual Therapy (91376) as indicated to include: Passive Range of Motion, Gr I-IV mobilizations, Soft Tissue Mobilization, Trigger Point Release, and Myofascial Release  Modalities as needed that may include: Cryotherapy, Electrical Stimulation, and Vasoneumatic Compression  Patient education on joint protection, postural re-education, activity modification, and progression of HEP    Plan: POC initiated as per evaluation    Electronically Signed by Heather Giraldo PT  Date: 06/18/2024     Note: Portions of this note have been templated and/or copied from initial evaluation, reassessments and prior notes for documentation efficiency.    Note: If patient does not return

## 2024-06-20 ENCOUNTER — HOSPITAL ENCOUNTER (OUTPATIENT)
Dept: PHYSICAL THERAPY | Age: 49
Setting detail: THERAPIES SERIES
Discharge: HOME | End: 2024-06-20
Payer: COMMERCIAL

## 2024-06-20 PROCEDURE — 97112 NEUROMUSCULAR REEDUCATION: CPT | Performed by: PHYSICAL THERAPIST

## 2024-06-20 PROCEDURE — 97110 THERAPEUTIC EXERCISES: CPT | Performed by: PHYSICAL THERAPIST

## 2024-06-20 NOTE — FLOWSHEET NOTE
socks and shoes.  [] Progressing: [] Met: [] Not Met: [] Adjusted  2. Patient will demonstrate increased AROM of L knee extension/flexion to 0-120 without pain to allow for proper joint functioning to enable patient to sit comfortably in car.   [] Progressing: [] Met: [] Not Met: [] Adjusted  3. Patient will demonstrate increased Strength of L quad to within 5lb with HHD of contralateral limb to allow for proper functional mobility to enable patient to return to performing reciprocal stairs.   [] Progressing: [] Met: [] Not Met: [] Adjusted  4. Patient will return to walking 2+ miles without increased symptoms or restriction.   [] Progressing: [] Met: [] Not Met: [] Adjusted  5. Patient will return to full duty as a  without increased symptoms or restriction. (Requires carrying 60+# of gear and climbing 125ft ladder)  [] Progressing: [] Met: [] Not Met: [] Adjusted     Overall Progression Towards Functional goals/ Treatment Progress Update:  [] Patient is progressing as expected towards functional goals listed.    [] Progression is slowed due to complexities/Impairments listed.  [] Progression has been slowed due to co-morbidities.  [x] Plan just implemented, too soon (<30days) to assess goals progression   [] Goals require adjustment due to lack of progress  [] Patient is not progressing as expected and requires additional follow up with physician  [] Other:     TREATMENT PLAN     Frequency/Duration: 2-3x/week for 12 weeks for the following treatment interventions:    Interventions:  Therapeutic Exercise (87794) including: strength training, ROM, and functional mobility  Therapeutic Activities (51449) including: functional mobility training and education.  Neuromuscular Re-education (92768) activation and proprioception, including postural re-education.    Gait Training (03724) for normalization of ambulation patterns and AD training.   Manual Therapy (38821) as indicated to include: Passive Range of

## 2024-06-25 ENCOUNTER — HOSPITAL ENCOUNTER (OUTPATIENT)
Dept: PHYSICAL THERAPY | Age: 49
Setting detail: THERAPIES SERIES
Discharge: HOME | End: 2024-06-25
Payer: COMMERCIAL

## 2024-06-25 PROCEDURE — 97110 THERAPEUTIC EXERCISES: CPT

## 2024-06-25 PROCEDURE — 97112 NEUROMUSCULAR REEDUCATION: CPT

## 2024-06-25 NOTE — FLOWSHEET NOTE
[facility] - Outpatient Rehabilitation and Therapy [address], [place] [address] office: [phone] fax: [phone]       Physical Therapy: TREATMENT/PROGRESS NOTE   Patient: Yandy Garcia (49 y.o. female)   Examination Date: 2024   :  1975 MRN: [id number]   Visit #: 3   Insurance Allowable Auth Needed   [facility] 18 through  []Yes    []No    Insurance: Payor: [email or web address] / Plan: [email or web address] / Product Type: *No Product type* /   Insurance ID: [id number] - (Worker's Comp)  Secondary Insurance (if applicable):    Treatment Diagnosis:     ICD-10-CM    1. Acute postoperative pain of left knee  G89.18     M25.562       2. Quadriceps weakness  M62.81       3. Difficulty walking  R26.2       4. Effusion of left knee  M25.462          Medical Diagnosis:  Acute lateral meniscus tear of left knee, subsequent encounter [S83.282D]   Referring Physician: Stefano Rowley, *  PCP: Lisa Ribeiro MD     Plan of care signed (Y/N):     Date of Patient follow up with Physician:      Progress Report/POC: NO  POC update due: (10 visits /OR AUTH LIMITS, whichever is less)  2024                                             Precautions/ Contra-indications:           Latex allergy:  YES  Pacemaker:    NO  Contraindications for Manipulation: recent surgical history (relative)  Date of Surgery: 24 L knee partial lateral menisectomy   Other:    Red Flags:  None    C-SSRS Triggered by Intake questionnaire:   Patient answered 'NO' to both behavioral questions on intake.  No further screening warranted    Preferred Language for Healthcare:   [x] English       [] other:    SUBJECTIVE EXAMINATION     Patient stated complaint:  to palpation, notes it feels like her knee cap is floating.        Test used Initial score  2024   Pain Summary VAS 1-6 1 rest  5-6 w/ bending   Functional questionnaire WOMAC 48/96  50% LOF    Other:                OBJECTIVE

## 2024-06-28 ENCOUNTER — HOSPITAL ENCOUNTER (OUTPATIENT)
Dept: PHYSICAL THERAPY | Age: 49
Setting detail: THERAPIES SERIES
Discharge: HOME | End: 2024-06-28
Payer: COMMERCIAL

## 2024-06-28 PROCEDURE — 97530 THERAPEUTIC ACTIVITIES: CPT

## 2024-06-28 PROCEDURE — 97110 THERAPEUTIC EXERCISES: CPT

## 2024-06-28 PROCEDURE — 97112 NEUROMUSCULAR REEDUCATION: CPT

## 2024-06-28 NOTE — FLOWSHEET NOTE
mobilizations, Soft Tissue Mobilization, Trigger Point Release, and Myofascial Release  Modalities as needed that may include: Cryotherapy, Electrical Stimulation, and Vasoneumatic Compression  Patient education on joint protection, postural re-education, activity modification, and progression of HEP    Plan: POC initiated as per evaluation    Electronically Signed by Antonia Posada, PT  Date: 06/28/2024     Note: Portions of this note have been templated and/or copied from initial evaluation, reassessments and prior notes for documentation efficiency.    Note: If patient does not return for scheduled/recommended follow up visits, this note will serve as a discharge from care along with the most recent update on progress.

## 2024-07-01 ENCOUNTER — HOSPITAL ENCOUNTER (OUTPATIENT)
Dept: PHYSICAL THERAPY | Age: 49
Setting detail: THERAPIES SERIES
Discharge: HOME | End: 2024-07-01
Payer: COMMERCIAL

## 2024-07-01 PROCEDURE — 97112 NEUROMUSCULAR REEDUCATION: CPT

## 2024-07-01 PROCEDURE — 97110 THERAPEUTIC EXERCISES: CPT

## 2024-07-01 PROCEDURE — 97530 THERAPEUTIC ACTIVITIES: CPT

## 2024-07-01 NOTE — FLOWSHEET NOTE
Disability index score of 25% or less for the WOMAC to assist with reaching prior level of function with activities such as don/doff socks and shoes.  [] Progressing: [] Met: [] Not Met: [] Adjusted  2. Patient will demonstrate increased AROM of L knee extension/flexion to 0-120 without pain to allow for proper joint functioning to enable patient to sit comfortably in car.   [] Progressing: [] Met: [] Not Met: [] Adjusted  3. Patient will demonstrate increased Strength of L quad to within 5lb with HHD of contralateral limb to allow for proper functional mobility to enable patient to return to performing reciprocal stairs.   [] Progressing: [] Met: [] Not Met: [] Adjusted  4. Patient will return to walking 2+ miles without increased symptoms or restriction.   [] Progressing: [] Met: [] Not Met: [] Adjusted  5. Patient will return to full duty as a  without increased symptoms or restriction. (Requires carrying 60+# of gear and climbing 125ft ladder)  [] Progressing: [] Met: [] Not Met: [] Adjusted     Overall Progression Towards Functional goals/ Treatment Progress Update:  [] Patient is progressing as expected towards functional goals listed.    [] Progression is slowed due to complexities/Impairments listed.  [] Progression has been slowed due to co-morbidities.  [x] Plan just implemented, too soon (<30days) to assess goals progression   [] Goals require adjustment due to lack of progress  [] Patient is not progressing as expected and requires additional follow up with physician  [] Other:     TREATMENT PLAN     Frequency/Duration: 2-3x/week for 12 weeks for the following treatment interventions:    Interventions:  Therapeutic Exercise (41612) including: strength training, ROM, and functional mobility  Therapeutic Activities (57928) including: functional mobility training and education.  Neuromuscular Re-education (41419) activation and proprioception, including postural re-education.    Gait Training

## 2024-07-02 ENCOUNTER — TELEPHONE (OUTPATIENT)
Age: 49
End: 2024-07-02

## 2024-07-03 ENCOUNTER — HOSPITAL ENCOUNTER (OUTPATIENT)
Dept: PHYSICAL THERAPY | Age: 49
Setting detail: THERAPIES SERIES
Discharge: HOME | End: 2024-07-03
Payer: COMMERCIAL

## 2024-07-03 PROCEDURE — 97530 THERAPEUTIC ACTIVITIES: CPT | Performed by: PHYSICAL THERAPIST

## 2024-07-03 PROCEDURE — 97110 THERAPEUTIC EXERCISES: CPT | Performed by: PHYSICAL THERAPIST

## 2024-07-03 PROCEDURE — 97112 NEUROMUSCULAR REEDUCATION: CPT | Performed by: PHYSICAL THERAPIST

## 2024-07-03 NOTE — FLOWSHEET NOTE
[facility] - Outpatient Rehabilitation and Therapy [address], [place] [address] office: [phone] fax: [phone]       Physical Therapy: TREATMENT/PROGRESS NOTE   Patient: Yandy Garcia (49 y.o. female)   Examination Date: 2024   :  1975 MRN: [id number]   Visit #: 6   Insurance Allowable Auth Needed   [facility] 18 through  []Yes    []No    Insurance: Payor: [employer] / Plan: Jelly[employer]asterO / Product Type: *No Product type* /   Insurance ID: [id number] - (Worker's Comp)  Secondary Insurance (if applicable):    Treatment Diagnosis:     ICD-10-CM    1. Acute postoperative pain of left knee  G89.18     M25.562       2. Quadriceps weakness  M62.81       3. Difficulty walking  R26.2       4. Effusion of left knee  M25.462          Medical Diagnosis:  Acute lateral meniscus tear of left knee, subsequent encounter [S83.282D]   Referring Physician: Stefano Rowley, *  PCP: Lisa Ribeiro MD     Plan of care signed (Y/N):     Date of Patient follow up with Physician:      Progress Report/POC: NO  POC update due: (10 visits /OR AUTH LIMITS, whichever is less)  2024 - date extension needed for [facility]                                             Precautions/ Contra-indications:           Latex allergy:  YES  Pacemaker:    NO  Contraindications for Manipulation: recent surgical history (relative)  Date of Surgery: 24 L knee partial lateral menisectomy   Other:    Red Flags:  None    C-SSRS Triggered by Intake questionnaire:   Patient answered 'NO' to both behavioral questions on intake.  No further screening warranted    Preferred Language for Healthcare:   [x] English       [] other:    SUBJECTIVE EXAMINATION     Patient stated complaint: Pt. Reports that her knee is feeling ok. She continues to have some stiffness and positional discomfort. Pt. Notes that she was able to ascend stairs reciprocally but descends in step to pattern.        Test used Initial

## 2024-07-08 ENCOUNTER — HOSPITAL ENCOUNTER (OUTPATIENT)
Dept: PHYSICAL THERAPY | Age: 49
Setting detail: THERAPIES SERIES
Discharge: HOME | End: 2024-07-08
Payer: COMMERCIAL

## 2024-07-08 PROCEDURE — 97112 NEUROMUSCULAR REEDUCATION: CPT | Performed by: PHYSICAL THERAPIST

## 2024-07-08 PROCEDURE — 97110 THERAPEUTIC EXERCISES: CPT | Performed by: PHYSICAL THERAPIST

## 2024-07-08 PROCEDURE — 97530 THERAPEUTIC ACTIVITIES: CPT | Performed by: PHYSICAL THERAPIST

## 2024-07-08 NOTE — FLOWSHEET NOTE
goals for Patient:   Short Term Goals: To be achieved in: 2 weeks  1. Independent in HEP and progression per patient tolerance, in order to prevent re-injury.   [] Progressing: [] Met: [] Not Met: [] Adjusted  2. Patient will have a decrease in pain to <3/10 to facilitate improvement in movement, function, and ADLs as indicated by Functional Deficits.  [] Progressing: [] Met: [] Not Met: [] Adjusted    Long Term Goals: To be achieved in: 12 weeks  1. Disability index score of 25% or less for the WOMAC to assist with reaching prior level of function with activities such as don/doff socks and shoes.  [] Progressing: [] Met: [] Not Met: [] Adjusted  2. Patient will demonstrate increased AROM of L knee extension/flexion to 0-120 without pain to allow for proper joint functioning to enable patient to sit comfortably in car.   [] Progressing: [] Met: [] Not Met: [] Adjusted  3. Patient will demonstrate increased Strength of L quad to within 5lb with HHD of contralateral limb to allow for proper functional mobility to enable patient to return to performing reciprocal stairs.   [] Progressing: [] Met: [] Not Met: [] Adjusted  4. Patient will return to walking 2+ miles without increased symptoms or restriction.   [] Progressing: [] Met: [] Not Met: [] Adjusted  5. Patient will return to full duty as a  without increased symptoms or restriction. (Requires carrying 60+# of gear and climbing 125ft ladder)  [] Progressing: [] Met: [] Not Met: [] Adjusted     Overall Progression Towards Functional goals/ Treatment Progress Update:  [] Patient is progressing as expected towards functional goals listed.    [] Progression is slowed due to complexities/Impairments listed.  [] Progression has been slowed due to co-morbidities.  [x] Plan just implemented, too soon (<30days) to assess goals progression   [] Goals require adjustment due to lack of progress  [] Patient is not progressing as expected and requires additional

## 2024-07-09 ENCOUNTER — OFFICE VISIT (OUTPATIENT)
Age: 49
End: 2024-07-09

## 2024-07-09 VITALS — BODY MASS INDEX: 47.48 KG/M2 | HEIGHT: 65 IN | WEIGHT: 285 LBS

## 2024-07-09 DIAGNOSIS — S83.282D ACUTE LATERAL MENISCUS TEAR OF LEFT KNEE, SUBSEQUENT ENCOUNTER: Primary | ICD-10-CM

## 2024-07-09 NOTE — PROGRESS NOTES
Yandy Garcia  [id number]  Encounter Date: 7/9/2024  YOB: 1975    Chief Complaint   Patient presents with    Follow-up     Acute lateral meniscus tear of left knee, subsequent encounter       History:Ms. Yandy Garcia is here in follow up regarding her left knee status post arthroscopy with partial lateral meniscectomy.  Pain is averaging 1/10.  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  No new injuries since her last visit.  She still working on regaining strength for stairs and finds it uncomfortable trying to kneel on the knee.  Surgery date was May 30th and this is her 6-week postop visit.  PT notes were reviewed.    Exam:  Ht 1.651 m (5' 5\")   Wt 129.3 kg (285 lb)   BMI 47.43 kg/m²   General: Alert and oriented x3, No acute distress, Cooperative and conversant.  Obesity Present  Mood and affect are appropriate.  Left knee: Portal sites are well-healed.  No significant joint effusion.  She has full active extension and tolerates flexion beyond 110 degrees in the seated position.  No significant tenderness to palpation of the knee.  Quad strength 4/5.  Sensation to light touch grossly present throughout the left lower extremity  Capillary refill < 2 seconds   Skin: no erythema, lesions or rashes  No signs / symptoms of DVT / PE or infection    Assessment:   Diagnosis Orders   1. Acute lateral meniscus tear of left knee, subsequent encounter          Plan:  We discussed treatment options today.  She will continue with her strengthening program with physical therapy and gradually work back to her usual job duties.  She is okay to return to light duty July 11.  No kneeling, squatting, crawling, climbing, twisting, stooping or bending.  Forms for her Worker's Compensation were completed today with other restrictions including lifting limits.  I will see her back in 4 weeks time and we will check her progress.

## 2024-07-10 ENCOUNTER — HOSPITAL ENCOUNTER (OUTPATIENT)
Dept: PHYSICAL THERAPY | Age: 49
Setting detail: THERAPIES SERIES
Discharge: HOME | End: 2024-07-10
Payer: COMMERCIAL

## 2024-07-10 PROCEDURE — 97110 THERAPEUTIC EXERCISES: CPT

## 2024-07-10 PROCEDURE — 97112 NEUROMUSCULAR REEDUCATION: CPT

## 2024-07-10 PROCEDURE — 97530 THERAPEUTIC ACTIVITIES: CPT

## 2024-07-10 NOTE — FLOWSHEET NOTE
interventions:    Interventions:  Therapeutic Exercise (71213) including: strength training, ROM, and functional mobility  Therapeutic Activities (58255) including: functional mobility training and education.  Neuromuscular Re-education (23313) activation and proprioception, including postural re-education.    Gait Training (26801) for normalization of ambulation patterns and AD training.   Manual Therapy (11953) as indicated to include: Passive Range of Motion, Gr I-IV mobilizations, Soft Tissue Mobilization, Trigger Point Release, and Myofascial Release  Modalities as needed that may include: Cryotherapy, Electrical Stimulation, and Vasoneumatic Compression  Patient education on joint protection, postural re-education, activity modification, and progression of HEP    Plan:  Progress strength as tolerated     Electronically Signed by Antonia Posada PT  Date: 07/10/2024     Note: Portions of this note have been templated and/or copied from initial evaluation, reassessments and prior notes for documentation efficiency.    Note: If patient does not return for scheduled/recommended follow up visits, this note will serve as a discharge from care along with the most recent update on progress.

## 2024-07-16 ENCOUNTER — HOSPITAL ENCOUNTER (OUTPATIENT)
Dept: PHYSICAL THERAPY | Age: 49
Setting detail: THERAPIES SERIES
Discharge: HOME | End: 2024-07-16
Payer: COMMERCIAL

## 2024-07-16 PROCEDURE — 97016 VASOPNEUMATIC DEVICE THERAPY: CPT

## 2024-07-16 PROCEDURE — 97530 THERAPEUTIC ACTIVITIES: CPT

## 2024-07-16 PROCEDURE — 97110 THERAPEUTIC EXERCISES: CPT

## 2024-07-16 NOTE — FLOWSHEET NOTE
billed in 15-minute increments.    GOALS     Patient stated goal: return to work as   [] Progressing: [] Met: [] Not Met: [] Adjusted    Therapist goals for Patient:   Short Term Goals: To be achieved in: 2 weeks  1. Independent in HEP and progression per patient tolerance, in order to prevent re-injury.   [] Progressing: [] Met: [] Not Met: [] Adjusted  2. Patient will have a decrease in pain to <3/10 to facilitate improvement in movement, function, and ADLs as indicated by Functional Deficits.  [] Progressing: [] Met: [] Not Met: [] Adjusted    Long Term Goals: To be achieved in: 12 weeks  1. Disability index score of 25% or less for the WOMAC to assist with reaching prior level of function with activities such as don/doff socks and shoes.  [] Progressing: [] Met: [] Not Met: [] Adjusted  2. Patient will demonstrate increased AROM of L knee extension/flexion to 0-120 without pain to allow for proper joint functioning to enable patient to sit comfortably in car.   [] Progressing: [] Met: [] Not Met: [] Adjusted  3. Patient will demonstrate increased Strength of L quad to within 5lb with HHD of contralateral limb to allow for proper functional mobility to enable patient to return to performing reciprocal stairs.   [] Progressing: [] Met: [] Not Met: [] Adjusted  4. Patient will return to walking 2+ miles without increased symptoms or restriction.   [] Progressing: [] Met: [] Not Met: [] Adjusted  5. Patient will return to full duty as a  without increased symptoms or restriction. (Requires carrying 60+# of gear and climbing 125ft ladder)  [] Progressing: [] Met: [] Not Met: [] Adjusted     Overall Progression Towards Functional goals/ Treatment Progress Update:  [] Patient is progressing as expected towards functional goals listed.    [] Progression is slowed due to complexities/Impairments listed.  [] Progression has been slowed due to co-morbidities.  [x] Plan just implemented, too soon

## 2024-07-19 ENCOUNTER — HOSPITAL ENCOUNTER (OUTPATIENT)
Dept: PHYSICAL THERAPY | Age: 49
Setting detail: THERAPIES SERIES
Discharge: HOME | End: 2024-07-19
Payer: COMMERCIAL

## 2024-07-19 PROCEDURE — 97140 MANUAL THERAPY 1/> REGIONS: CPT

## 2024-07-19 PROCEDURE — 97110 THERAPEUTIC EXERCISES: CPT

## 2024-07-19 PROCEDURE — 97016 VASOPNEUMATIC DEVICE THERAPY: CPT

## 2024-07-19 NOTE — FLOWSHEET NOTE
progress  [] Patient is not progressing as expected and requires additional follow up with physician  [] Other:     TREATMENT PLAN     Frequency/Duration: 2-3x/week for 12 weeks for the following treatment interventions:    Interventions:  Therapeutic Exercise (24803) including: strength training, ROM, and functional mobility  Therapeutic Activities (53781) including: functional mobility training and education.  Neuromuscular Re-education (37604) activation and proprioception, including postural re-education.    Gait Training (29255) for normalization of ambulation patterns and AD training.   Manual Therapy (53109) as indicated to include: Passive Range of Motion, Gr I-IV mobilizations, Soft Tissue Mobilization, Trigger Point Release, and Myofascial Release  Modalities as needed that may include: Cryotherapy, Electrical Stimulation, and Vasoneumatic Compression  Patient education on joint protection, postural re-education, activity modification, and progression of HEP    Plan:  Progress strength as tolerated     Electronically Signed by Antonia Posada, PT  Date: 07/19/2024     Note: Portions of this note have been templated and/or copied from initial evaluation, reassessments and prior notes for documentation efficiency.    Note: If patient does not return for scheduled/recommended follow up visits, this note will serve as a discharge from care along with the most recent update on progress.

## 2024-07-23 ENCOUNTER — HOSPITAL ENCOUNTER (OUTPATIENT)
Dept: PHYSICAL THERAPY | Age: 49
Setting detail: THERAPIES SERIES
Discharge: HOME | End: 2024-07-23
Payer: COMMERCIAL

## 2024-07-23 PROCEDURE — 97140 MANUAL THERAPY 1/> REGIONS: CPT

## 2024-07-23 PROCEDURE — 97530 THERAPEUTIC ACTIVITIES: CPT

## 2024-07-23 PROCEDURE — 97110 THERAPEUTIC EXERCISES: CPT

## 2024-07-23 NOTE — FLOWSHEET NOTE
[facility] - Outpatient Rehabilitation and Therapy [address], [place] [address] office: [phone] fax: [phone]       Physical Therapy: TREATMENT/PROGRESS NOTE   Patient: Yandy Garcia (49 y.o. female)   Examination Date: 2024   :  1975 MRN: [id number]   Visit #: 11   Insurance Allowable Auth Needed   [facility] 18 through  []Yes    []No    Insurance: Payor: [employer] / Plan: [employer]O / Product Type: *No Product type* /   Insurance ID: [id number] - (Worker's Comp)  Secondary Insurance (if applicable):    Treatment Diagnosis:     ICD-10-CM    1. Acute postoperative pain of left knee  G89.18     M25.562       2. Quadriceps weakness  M62.81       3. Difficulty walking  R26.2       4. Effusion of left knee  M25.462          Medical Diagnosis:  Acute lateral meniscus tear of left knee, subsequent encounter [S83.282D]   Referring Physician: Stefano Rowley, *  PCP: Lisa Ribeiro MD     Plan of care signed (Y/N): Y    Date of Patient follow up with Physician:      Progress Report/POC: NO  POC update due: (10 visits /OR AUTH LIMITS, whichever is less)  date extension to                                             Precautions/ Contra-indications:           Latex allergy:  YES  Pacemaker:    NO  Contraindications for Manipulation: recent surgical history (relative)  Date of Surgery: 24 L knee partial lateral menisectomy   Other:    Red Flags:  None    C-SSRS Triggered by Intake questionnaire:   Patient answered 'NO' to both behavioral questions on intake.  No further screening warranted    Preferred Language for Healthcare:   [x] English       [] other:    SUBJECTIVE EXAMINATION     Patient stated complaint: notes the needling was helpful, thinks her hamstring may have been bothering her more than she realized        Test used Initial score  2024   Pain Summary VAS 1-6 0 rest   Functional questionnaire WOMAC 48/96  50% LOF    Other:

## 2024-07-26 ENCOUNTER — HOSPITAL ENCOUNTER (OUTPATIENT)
Dept: PHYSICAL THERAPY | Age: 49
Setting detail: THERAPIES SERIES
Discharge: HOME | End: 2024-07-26
Payer: COMMERCIAL

## 2024-07-26 PROCEDURE — 97110 THERAPEUTIC EXERCISES: CPT | Performed by: PHYSICAL THERAPIST

## 2024-07-26 PROCEDURE — 97032 APPL MODALITY 1+ESTIM EA 15: CPT | Performed by: PHYSICAL THERAPIST

## 2024-07-26 PROCEDURE — 97530 THERAPEUTIC ACTIVITIES: CPT | Performed by: PHYSICAL THERAPIST

## 2024-07-26 NOTE — FLOWSHEET NOTE
[facility] - Outpatient Rehabilitation and Therapy [address], [place] [address] office: [phone] fax: [phone]       Physical Therapy: TREATMENT/PROGRESS NOTE   Patient: Yandy Garcia (49 y.o. female)   Examination Date: 2024   :  1975 MRN: [id number]   Visit #: 12   Insurance Allowable Auth Needed   [facility] 18 through  []Yes    []No    Insurance: Payor: [employer] / Plan: [employer]O / Product Type: *No Product type* /   Insurance ID: [id number] - (Worker's Comp)  Secondary Insurance (if applicable):    Treatment Diagnosis:     ICD-10-CM    1. Acute postoperative pain of left knee  G89.18     M25.562       2. Quadriceps weakness  M62.81       3. Difficulty walking  R26.2       4. Effusion of left knee  M25.462          Medical Diagnosis:  Acute lateral meniscus tear of left knee, subsequent encounter [S83.282D]   Referring Physician: Stefano Rowley, *  PCP: Lisa Ribeiro MD     Plan of care signed (Y/N): Y    Date of Patient follow up with Physician:      Progress Report/POC: NO  POC update due: (10 visits /OR AUTH LIMITS, whichever is less)  date extension to                                             Precautions/ Contra-indications:           Latex allergy:  YES  Pacemaker:    NO  Contraindications for Manipulation: recent surgical history (relative)  Date of Surgery: 24 L knee partial lateral menisectomy   Other:    Red Flags:  None    C-SSRS Triggered by Intake questionnaire:   Patient answered 'NO' to both behavioral questions on intake.  No further screening warranted    Preferred Language for Healthcare:   [x] English       [] other:    SUBJECTIVE EXAMINATION     Patient stated complaint: pt. States that her hamstring continues to be a little sore, especially with high step ups. Pt. States that it is a little better than last week.        Test used Initial score  2024   Pain Summary VAS 1-6 0 rest   Functional

## 2024-07-30 ENCOUNTER — HOSPITAL ENCOUNTER (OUTPATIENT)
Dept: PHYSICAL THERAPY | Age: 49
Setting detail: THERAPIES SERIES
Discharge: HOME | End: 2024-07-30
Payer: COMMERCIAL

## 2024-07-30 PROCEDURE — 97110 THERAPEUTIC EXERCISES: CPT

## 2024-07-30 PROCEDURE — 97140 MANUAL THERAPY 1/> REGIONS: CPT

## 2024-07-30 PROCEDURE — 97530 THERAPEUTIC ACTIVITIES: CPT

## 2024-07-30 NOTE — FLOWSHEET NOTE
Progressing: [] Met: [] Not Met: [] Adjusted  2. Patient will demonstrate increased AROM of L knee extension/flexion to 0-120 without pain to allow for proper joint functioning to enable patient to sit comfortably in car.   [] Progressing: [] Met: [] Not Met: [] Adjusted  3. Patient will demonstrate increased Strength of L quad to within 5lb with HHD of contralateral limb to allow for proper functional mobility to enable patient to return to performing reciprocal stairs.   [] Progressing: [] Met: [] Not Met: [] Adjusted  4. Patient will return to walking 2+ miles without increased symptoms or restriction.   [] Progressing: [] Met: [] Not Met: [] Adjusted  5. Patient will return to full duty as a  without increased symptoms or restriction. (Requires carrying 60+# of gear and climbing 125ft ladder)  [] Progressing: [] Met: [] Not Met: [] Adjusted     Overall Progression Towards Functional goals/ Treatment Progress Update:  [] Patient is progressing as expected towards functional goals listed.    [] Progression is slowed due to complexities/Impairments listed.  [] Progression has been slowed due to co-morbidities.  [x] Plan just implemented, too soon (<30days) to assess goals progression   [] Goals require adjustment due to lack of progress  [] Patient is not progressing as expected and requires additional follow up with physician  [] Other:     TREATMENT PLAN     Frequency/Duration: 2-3x/week for 12 weeks for the following treatment interventions:    Interventions:  Therapeutic Exercise (72388) including: strength training, ROM, and functional mobility  Therapeutic Activities (42124) including: functional mobility training and education.  Neuromuscular Re-education (57372) activation and proprioception, including postural re-education.    Gait Training (18661) for normalization of ambulation patterns and AD training.   Manual Therapy (21783) as indicated to include: Passive Range of Motion, Gr I-IV

## 2024-08-02 ENCOUNTER — HOSPITAL ENCOUNTER (OUTPATIENT)
Dept: PHYSICAL THERAPY | Age: 49
Setting detail: THERAPIES SERIES
Discharge: HOME | End: 2024-08-02
Payer: COMMERCIAL

## 2024-08-02 PROCEDURE — 97140 MANUAL THERAPY 1/> REGIONS: CPT

## 2024-08-02 PROCEDURE — 97110 THERAPEUTIC EXERCISES: CPT

## 2024-08-02 NOTE — FLOWSHEET NOTE
[facility] - Outpatient Rehabilitation and Therapy [address], [place] [address] office: [phone] fax: [phone]       Physical Therapy: TREATMENT/PROGRESS NOTE   Patient: Yandy Garcia (49 y.o. female)   Examination Date: 2024   :  1975 MRN: [id number]   Visit #: 14   Insurance Allowable Auth Needed   [facility] 18 through  []Yes    []No    Insurance: Payor: [employer] / Plan: AdlyO / Product Type: *No Product type* /   Insurance ID: [id number] - (Worker's Comp)  Secondary Insurance (if applicable):    Treatment Diagnosis:     ICD-10-CM    1. Acute postoperative pain of left knee  G89.18     M25.562       2. Quadriceps weakness  M62.81       3. Difficulty walking  R26.2       4. Effusion of left knee  M25.462          Medical Diagnosis:  Acute lateral meniscus tear of left knee, subsequent encounter [S83.282D]   Referring Physician: Stefano Rowley, *  PCP: Lisa Ribeiro MD     Plan of care signed (Y/N): Y    Date of Patient follow up with Physician:      Progress Report/POC: NO  POC update due: (10 visits /OR AUTH LIMITS, whichever is less)  date extension to                                             Precautions/ Contra-indications:           Latex allergy:  YES  Pacemaker:    NO  Contraindications for Manipulation: recent surgical history (relative)  Date of Surgery: 24 L knee partial lateral menisectomy   Other:    Red Flags:  None    C-SSRS Triggered by Intake questionnaire:   Patient answered 'NO' to both behavioral questions on intake.  No further screening warranted    Preferred Language for Healthcare:   [x] English       [] other:    SUBJECTIVE EXAMINATION     Patient stated complaint: Thinks that maybe she allowed the STM to go to high last visit and thinks her hamstring were a little more irritated.         Test used Initial score  2024   Pain Summary VAS 1-6 0 rest   Functional questionnaire WOMAC

## 2024-08-06 ENCOUNTER — HOSPITAL ENCOUNTER (OUTPATIENT)
Dept: PHYSICAL THERAPY | Age: 49
Setting detail: THERAPIES SERIES
Discharge: HOME | End: 2024-08-06
Payer: COMMERCIAL

## 2024-08-06 PROCEDURE — 97140 MANUAL THERAPY 1/> REGIONS: CPT

## 2024-08-06 PROCEDURE — 97110 THERAPEUTIC EXERCISES: CPT

## 2024-08-06 PROCEDURE — 20560 NDL INSJ W/O NJX 1 OR 2 MUSC: CPT

## 2024-08-06 PROCEDURE — 97530 THERAPEUTIC ACTIVITIES: CPT

## 2024-08-06 NOTE — FLOWSHEET NOTE
Not Met: [] Adjusted  2. Patient will demonstrate increased AROM of L knee extension/flexion to 0-120 without pain to allow for proper joint functioning to enable patient to sit comfortably in car.   [] Progressing: [] Met: [] Not Met: [] Adjusted  3. Patient will demonstrate increased Strength of L quad to within 5lb with HHD of contralateral limb to allow for proper functional mobility to enable patient to return to performing reciprocal stairs.   [] Progressing: [] Met: [] Not Met: [] Adjusted  4. Patient will return to walking 2+ miles without increased symptoms or restriction.   [] Progressing: [] Met: [] Not Met: [] Adjusted  5. Patient will return to full duty as a  without increased symptoms or restriction. (Requires carrying 60+# of gear and climbing 125ft ladder)  [] Progressing: [] Met: [] Not Met: [] Adjusted     Overall Progression Towards Functional goals/ Treatment Progress Update:  [] Patient is progressing as expected towards functional goals listed.    [] Progression is slowed due to complexities/Impairments listed.  [] Progression has been slowed due to co-morbidities.  [x] Plan just implemented, too soon (<30days) to assess goals progression   [] Goals require adjustment due to lack of progress  [] Patient is not progressing as expected and requires additional follow up with physician  [] Other:     TREATMENT PLAN     Frequency/Duration: 2-3x/week for 12 weeks for the following treatment interventions:    Interventions:  Therapeutic Exercise (45300) including: strength training, ROM, and functional mobility  Therapeutic Activities (93851) including: functional mobility training and education.  Neuromuscular Re-education (51258) activation and proprioception, including postural re-education.    Gait Training (55847) for normalization of ambulation patterns and AD training.   Manual Therapy (18720) as indicated to include: Passive Range of Motion, Gr I-IV mobilizations, Soft Tissue

## 2024-08-09 ENCOUNTER — HOSPITAL ENCOUNTER (OUTPATIENT)
Dept: PHYSICAL THERAPY | Age: 49
Setting detail: THERAPIES SERIES
Discharge: HOME | End: 2024-08-09
Payer: COMMERCIAL

## 2024-08-09 PROCEDURE — 97032 APPL MODALITY 1+ESTIM EA 15: CPT

## 2024-08-09 PROCEDURE — 97110 THERAPEUTIC EXERCISES: CPT

## 2024-08-09 PROCEDURE — 97140 MANUAL THERAPY 1/> REGIONS: CPT

## 2024-08-09 PROCEDURE — 20560 NDL INSJ W/O NJX 1 OR 2 MUSC: CPT

## 2024-08-09 NOTE — FLOWSHEET NOTE
to produce intramuscular mobilization.  These techniques were used to restore functional range of motion, reduce muscle spasm and induce healing in the corresponding musculature. (58965)  Clean Technique was utilized today while applying Dry needling treatment.  The treatment sites where cleaned with 70% solution of  isopropyl alcohol .  The PT washed their hands and utilized treatment gloves along with hand  prior to inserting the needles.  All needles where removed and discarded in the appropriate sharps container.  MD has given verbal and/or written approval for this treatment.     Attended low frequency (1-20Hz) electrical stimulation was utilized in conjunction with Dry Needling:  the Estim was manipulated between all above needles for a period of 10 min.  at 6-8 volts.  The low frequency electrical stimulation was used to help reduce muscle spasm and help to interrupt /Gaines the pain cycle. (31883)         Modalities:          Education/Home Exercise Program: Patient HEP program created electronically.  Refer to [employer] access code: Access Code: [email or web address]  Access Code: [email or web address]  URL: [email or web address]  Date: 06/18/2024  Prepared by: Heather Giraldo    Exercises  - Long Sitting Calf Stretch with Strap  - 1-2 x daily - 7 x weekly - 3 reps - 30s hold  - Seated Table Hamstring Stretch  - 1-2 x daily - 7 x weekly - 3 reps - 30s hold  - Quad Set  - 1-2 x daily - 7 x weekly - 10 reps - 5s hold  - Seated Knee Flexion AAROM  - 1-2 x daily - 7 x weekly - 15 reps  - Standing Terminal Knee Extension with Resistance  - 1-2 x daily - 7 x weekly - 2 sets - 10 reps  - Sitting Heel Slide with Towel  - 1-2 x daily - 7 x weekly - 10 reps - 5s hold      ASSESSMENT     Today's Assessment:  Pt demonstrates improved control with step up this date. Pt. Will continue to benefit from skilled therapy in order to restore functional strength and allow for stair climbing for eventual return to full duty at work.

## 2024-08-20 ENCOUNTER — HOSPITAL ENCOUNTER (OUTPATIENT)
Dept: PHYSICAL THERAPY | Age: 49
Setting detail: THERAPIES SERIES
Discharge: HOME | End: 2024-08-20
Payer: COMMERCIAL

## 2024-08-20 PROCEDURE — 97140 MANUAL THERAPY 1/> REGIONS: CPT

## 2024-08-20 PROCEDURE — 97530 THERAPEUTIC ACTIVITIES: CPT

## 2024-08-20 PROCEDURE — 97110 THERAPEUTIC EXERCISES: CPT

## 2024-08-20 NOTE — FLOWSHEET NOTE
Not Met: [] Adjusted  3. Patient will demonstrate increased Strength of L quad to within 5lb with HHD of contralateral limb to allow for proper functional mobility to enable patient to return to performing reciprocal stairs.   [] Progressing: [] Met: [] Not Met: [] Adjusted  4. Patient will return to walking 2+ miles without increased symptoms or restriction.   [] Progressing: [] Met: [] Not Met: [] Adjusted  5. Patient will return to full duty as a  without increased symptoms or restriction. (Requires carrying 60+# of gear and climbing 125ft ladder)  [] Progressing: [] Met: [] Not Met: [] Adjusted     Overall Progression Towards Functional goals/ Treatment Progress Update:  [] Patient is progressing as expected towards functional goals listed.    [] Progression is slowed due to complexities/Impairments listed.  [] Progression has been slowed due to co-morbidities.  [x] Plan just implemented, too soon (<30days) to assess goals progression   [] Goals require adjustment due to lack of progress  [] Patient is not progressing as expected and requires additional follow up with physician  [] Other:     TREATMENT PLAN     Frequency/Duration: 2-3x/week for 12 weeks for the following treatment interventions:    Interventions:  Therapeutic Exercise (36669) including: strength training, ROM, and functional mobility  Therapeutic Activities (82709) including: functional mobility training and education.  Neuromuscular Re-education (88709) activation and proprioception, including postural re-education.    Gait Training (36813) for normalization of ambulation patterns and AD training.   Manual Therapy (47951) as indicated to include: Passive Range of Motion, Gr I-IV mobilizations, Soft Tissue Mobilization, Trigger Point Release, and Myofascial Release  Modalities as needed that may include: Cryotherapy, Electrical Stimulation, and Vasoneumatic Compression  Patient education on joint protection, postural re-education,

## 2024-08-21 ENCOUNTER — OFFICE VISIT (OUTPATIENT)
Age: 49
End: 2024-08-21

## 2024-08-21 VITALS — HEIGHT: 65 IN | WEIGHT: 285 LBS | BODY MASS INDEX: 47.48 KG/M2

## 2024-08-21 DIAGNOSIS — S83.282D ACUTE LATERAL MENISCUS TEAR OF LEFT KNEE, SUBSEQUENT ENCOUNTER: Primary | ICD-10-CM

## 2024-08-21 NOTE — PROGRESS NOTES
Yandy Garcia  [id number]  Encounter Date: 8/21/2024  YOB: 1975    Chief Complaint   Patient presents with    Follow-up     Acute lateral meniscus tear of left knee, subsequent encounter       History:Ms. Yandy Garcia is here in follow up regarding her left knee lateral meniscus tear.  Overall, her discomfort is now minimal.  She is made good progress with her physical therapy.  No new injuries since her last visit.  She started working on activities that would stimulate climbing a ladder and feels like she is up to the task to returning to work as planned on August 30.    Exam:  Ht 1.651 m (5' 5\")   Wt 129.3 kg (285 lb)   BMI 47.43 kg/m²   General: Alert and oriented x3, No acute distress, Cooperative and conversant.  Mood and affect are appropriate.  Left knee: No significant swelling.  She has good functional range of motion in seated position.  No significant tenderness along the lateral joint line.  She does have some trace tenderness in her lateral hamstrings.  Normal tandem gait ambulating throughout the office today.      Assessment:   Diagnosis Orders   1. Acute lateral meniscus tear of left knee, subsequent encounter            Plan:  We discussed treatment options today.  She is making great progress with work simulation activities and physical therapy.  I recommend that she return to work as scheduled on August 30.  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  She can follow back with me on an as-needed basis.    She understands and accepts this course of care.      Stefano Rowley MD, FAAOS  Board Certified Orthopaedic Surgeon  [facility]  [place] [address] [place]    Phone:[phone]  Fax [phone]    08/21/24  3:59 PM    Documentation was done using voice recognition dragon software.  Every effort was made to ensure

## 2024-08-23 ENCOUNTER — HOSPITAL ENCOUNTER (OUTPATIENT)
Dept: PHYSICAL THERAPY | Age: 49
Setting detail: THERAPIES SERIES
Discharge: HOME | End: 2024-08-23
Payer: COMMERCIAL

## 2024-08-23 PROCEDURE — 97140 MANUAL THERAPY 1/> REGIONS: CPT

## 2024-08-23 PROCEDURE — 97110 THERAPEUTIC EXERCISES: CPT

## 2024-08-23 PROCEDURE — 97530 THERAPEUTIC ACTIVITIES: CPT

## 2024-08-23 NOTE — FLOWSHEET NOTE
modification, and progression of HEP    Plan:  Progress strength as tolerated     Electronically Signed by Antonia Posada, PT  Date: 08/23/2024     Note: Portions of this note have been templated and/or copied from initial evaluation, reassessments and prior notes for documentation efficiency.    Note: If patient does not return for scheduled/recommended follow up visits, this note will serve as a discharge from care along with the most recent update on progress.

## 2024-08-27 ENCOUNTER — APPOINTMENT (OUTPATIENT)
Dept: PHYSICAL THERAPY | Age: 49
End: 2024-08-27
Payer: COMMERCIAL

## 2024-08-30 ENCOUNTER — HOSPITAL ENCOUNTER (OUTPATIENT)
Dept: PHYSICAL THERAPY | Age: 49
Setting detail: THERAPIES SERIES
Discharge: HOME | End: 2024-08-30
Payer: COMMERCIAL

## 2024-08-30 PROCEDURE — 97110 THERAPEUTIC EXERCISES: CPT

## 2024-08-30 PROCEDURE — 97140 MANUAL THERAPY 1/> REGIONS: CPT

## 2024-08-30 PROCEDURE — 97530 THERAPEUTIC ACTIVITIES: CPT

## 2024-08-30 NOTE — FLOWSHEET NOTE
contralateral limb to allow for proper functional mobility to enable patient to return to performing reciprocal stairs.   [] Progressing: [] Met: [] Not Met: [] Adjusted  4. Patient will return to walking 2+ miles without increased symptoms or restriction.   [] Progressing: [] Met: [] Not Met: [] Adjusted  5. Patient will return to full duty as a  without increased symptoms or restriction. (Requires carrying 60+# of gear and climbing 125ft ladder)  [] Progressing: [] Met: [] Not Met: [] Adjusted     Overall Progression Towards Functional goals/ Treatment Progress Update:  [] Patient is progressing as expected towards functional goals listed.    [] Progression is slowed due to complexities/Impairments listed.  [] Progression has been slowed due to co-morbidities.  [x] Plan just implemented, too soon (<30days) to assess goals progression   [] Goals require adjustment due to lack of progress  [] Patient is not progressing as expected and requires additional follow up with physician  [] Other:     TREATMENT PLAN     Frequency/Duration: 2-3x/week for 12 weeks for the following treatment interventions:    Interventions:  Therapeutic Exercise (95344) including: strength training, ROM, and functional mobility  Therapeutic Activities (11747) including: functional mobility training and education.  Neuromuscular Re-education (06003) activation and proprioception, including postural re-education.    Gait Training (61864) for normalization of ambulation patterns and AD training.   Manual Therapy (08551) as indicated to include: Passive Range of Motion, Gr I-IV mobilizations, Soft Tissue Mobilization, Trigger Point Release, and Myofascial Release  Modalities as needed that may include: Cryotherapy, Electrical Stimulation, and Vasoneumatic Compression  Patient education on joint protection, postural re-education, activity modification, and progression of HEP    Plan:  Progress strength as tolerated     Electronically  Signed by Antonia Posada, PT  Date: 08/30/2024     Note: Portions of this note have been templated and/or copied from initial evaluation, reassessments and prior notes for documentation efficiency.    Note: If patient does not return for scheduled/recommended follow up visits, this note will serve as a discharge from care along with the most recent update on progress.

## 2024-09-03 ENCOUNTER — HOSPITAL ENCOUNTER (OUTPATIENT)
Dept: PHYSICAL THERAPY | Age: 49
Setting detail: THERAPIES SERIES
Discharge: HOME | End: 2024-09-03
Payer: COMMERCIAL

## 2024-09-03 PROCEDURE — 97140 MANUAL THERAPY 1/> REGIONS: CPT

## 2024-09-03 PROCEDURE — 97530 THERAPEUTIC ACTIVITIES: CPT

## 2024-09-03 PROCEDURE — 97110 THERAPEUTIC EXERCISES: CPT

## 2024-09-03 NOTE — FLOWSHEET NOTE
[facility] - Outpatient Rehabilitation and Therapy [address], [place] [address] office: [phone] fax: [phone]       Physical Therapy: TREATMENT/PROGRESS NOTE   Patient: Yandy Garcia (49 y.o. female)   Examination Date: 2024   :  1975 MRN: [id number]   Visit #: 20   Insurance Allowable Auth Needed   [facility] 18 through  []Yes    []No    Insurance: Payor: [employer] / Plan: [employer] / Product Type: *No Product type* /   Insurance ID: [id number] - (Worker's Comp)  Secondary Insurance (if applicable):    Treatment Diagnosis:     ICD-10-CM    1. Acute postoperative pain of left knee  G89.18     M25.562       2. Quadriceps weakness  M62.81       3. Difficulty walking  R26.2       4. Effusion of left knee  M25.462          Medical Diagnosis:  Acute lateral meniscus tear of left knee, subsequent encounter [S83.282D]   Referring Physician: Stefano Rowley, *  PCP: Lisa Ribeiro MD     Plan of care signed (Y/N): Y    Date of Patient follow up with Physician:      Progress Report/POC: NO  POC update due: (10 visits /OR AUTH LIMITS, whichever is less)  date extension to                                             Precautions/ Contra-indications:           Latex allergy:  YES  Pacemaker:    NO  Contraindications for Manipulation: recent surgical history (relative)  Date of Surgery: 24 L knee partial lateral menisectomy   Other:    Red Flags:  None    C-SSRS Triggered by Intake questionnaire:   Patient answered 'NO' to both behavioral questions on intake.  No further screening warranted    Preferred Language for Healthcare:   [x] English       [] other:    SUBJECTIVE EXAMINATION     Patient stated complaint: was sick over the weekend so notes all her joints were sore. Full duty starts tomorrow.         Test used Initial score  2024   Pain Summary VAS 1-6 0 rest   Functional questionnaire WOMAC 48/96  50% LOF    Other:

## 2024-09-06 ENCOUNTER — HOSPITAL ENCOUNTER (OUTPATIENT)
Dept: PHYSICAL THERAPY | Age: 49
Setting detail: THERAPIES SERIES
Discharge: HOME | End: 2024-09-06
Payer: COMMERCIAL

## 2024-09-06 PROCEDURE — 97110 THERAPEUTIC EXERCISES: CPT

## 2024-09-06 PROCEDURE — 97140 MANUAL THERAPY 1/> REGIONS: CPT

## 2024-09-06 NOTE — FLOWSHEET NOTE
to prevent loss of range of motion, maintain or improve muscular strength or increase flexibility, following either an injury or surgery.  (17406) MANUAL THERAPY -  Manual therapy techniques, 1 or more regions, each 15 minutes (Mobilization/manipulation, manual lymphatic drainage, manual traction) for the purpose of modulating pain, promoting relaxation,  increasing ROM, reducing/eliminating soft tissue swelling/inflammation/restriction, improving soft tissue extensibility and allowing for proper ROM for normal function with self care, mobility, lifting and ambulation  (99438) Needle insertion(s) without injection; 1 or 2 muscle(s).  (00669) ATTENDED ESTIM. Application of a modality to 1 or more areas; electrical stimulation (manual), each 15 minutes. Attended electrical stimulation requires direct (1-on-1) contact with the patient by the qualified professional/qualified personnel in providing electrical stimulation manually through the use of probes or other devices.    GOALS     Patient stated goal: return to work as   [] Progressing: [] Met: [] Not Met: [] Adjusted    Therapist goals for Patient:   Short Term Goals: To be achieved in: 2 weeks  1. Independent in HEP and progression per patient tolerance, in order to prevent re-injury.   [] Progressing: [] Met: [] Not Met: [] Adjusted  2. Patient will have a decrease in pain to <3/10 to facilitate improvement in movement, function, and ADLs as indicated by Functional Deficits.  [] Progressing: [] Met: [] Not Met: [] Adjusted    Long Term Goals: To be achieved in: 12 weeks  1. Disability index score of 25% or less for the WOMAC to assist with reaching prior level of function with activities such as don/doff socks and shoes.  [] Progressing: [] Met: [] Not Met: [] Adjusted  2. Patient will demonstrate increased AROM of L knee extension/flexion to 0-120 without pain to allow for proper joint functioning to enable patient to sit comfortably in car.   []

## 2024-09-11 ENCOUNTER — HOSPITAL ENCOUNTER (OUTPATIENT)
Dept: PHYSICAL THERAPY | Age: 49
Setting detail: THERAPIES SERIES
Discharge: HOME | End: 2024-09-11
Payer: COMMERCIAL

## 2024-09-11 PROCEDURE — 97140 MANUAL THERAPY 1/> REGIONS: CPT

## 2024-09-11 PROCEDURE — 97530 THERAPEUTIC ACTIVITIES: CPT

## 2024-09-11 PROCEDURE — 97110 THERAPEUTIC EXERCISES: CPT

## 2024-09-18 ENCOUNTER — HOSPITAL ENCOUNTER (OUTPATIENT)
Dept: PHYSICAL THERAPY | Age: 49
Setting detail: THERAPIES SERIES
Discharge: HOME | End: 2024-09-18
Payer: COMMERCIAL

## 2024-09-18 PROCEDURE — 97110 THERAPEUTIC EXERCISES: CPT

## 2024-09-18 PROCEDURE — 97140 MANUAL THERAPY 1/> REGIONS: CPT

## 2024-09-18 PROCEDURE — 97530 THERAPEUTIC ACTIVITIES: CPT

## 2024-09-24 ENCOUNTER — HOSPITAL ENCOUNTER (OUTPATIENT)
Dept: PHYSICAL THERAPY | Age: 49
Setting detail: THERAPIES SERIES
Discharge: HOME | End: 2024-09-24
Payer: COMMERCIAL

## 2024-09-24 PROCEDURE — 97140 MANUAL THERAPY 1/> REGIONS: CPT

## 2024-09-24 PROCEDURE — 97530 THERAPEUTIC ACTIVITIES: CPT

## 2024-09-24 PROCEDURE — 97110 THERAPEUTIC EXERCISES: CPT

## 2024-09-27 ENCOUNTER — APPOINTMENT (OUTPATIENT)
Dept: PHYSICAL THERAPY | Age: 49
End: 2024-09-27
Payer: COMMERCIAL

## 2024-10-02 ENCOUNTER — HOSPITAL ENCOUNTER (OUTPATIENT)
Dept: PHYSICAL THERAPY | Age: 49
Setting detail: THERAPIES SERIES
Discharge: HOME | End: 2024-10-02
Payer: COMMERCIAL

## 2024-10-02 PROCEDURE — 97530 THERAPEUTIC ACTIVITIES: CPT

## 2024-10-02 PROCEDURE — 97140 MANUAL THERAPY 1/> REGIONS: CPT

## 2024-10-02 PROCEDURE — 97110 THERAPEUTIC EXERCISES: CPT

## 2024-10-02 NOTE — PLAN OF CARE
[facility] - Outpatient Rehabilitation and Therapy [address], [place] [address] office: [phone] fax: [phone]  Physical Therapy Re-Certification Plan of Care    Dear Stefano Rowley, *  ,    We had the pleasure of treating the following patient for physical therapy services at [facility]. A summary of our findings can be found in the updated assessment below.  This includes our plan of care.  If you have any questions or concerns regarding these findings, please do not hesitate to contact me at the office phone number checked above.  Thank you for the referral.     Physician Signature:________________________________Date:__________________  By signing above (or electronic signature), therapist's plan is approved by physician      Functional Outcome: WOMAC 9  Yandy Garcia 1975 continues to present with functional deficits in endurance of strength, cardiovascular endurance, and eccentric control  limiting ability with walking on uneven ground, pushing or pulling activity, heavy home activity, and work duties  .  During therapy this date, patient required progression of exercises and program and manual interventions for exercise progression and improving proper muscle recruitment and activation/motor control patterns. Patient will continue to benefit from ongoing evaluation and advanced clinical decision from a Physical Therapist to improve muscle strength, neuromuscular control, endurance, tolerance to work activity, and high level IADLs to safely return to work/work related tasks and recreational activity without symptoms or restrictions.    Overall Response to Treatment:  Patient is responding well to treatment and improvement is noted with regards to goals    Total Visits: 25     Recommendation:    [] Continue PT 1x / wk for 4 weeks.   [] Hold PT, pending MD visit   [] Discharge to [facility]. Follow up with PT or MD PRN.

## 2024-10-15 ENCOUNTER — HOSPITAL ENCOUNTER (OUTPATIENT)
Dept: PHYSICAL THERAPY | Age: 49
Setting detail: THERAPIES SERIES
Discharge: HOME | End: 2024-10-15
Payer: COMMERCIAL

## 2024-10-15 ENCOUNTER — TELEPHONE (OUTPATIENT)
Dept: ORTHOPEDIC SURGERY | Age: 49
End: 2024-10-15

## 2024-10-15 PROCEDURE — 97530 THERAPEUTIC ACTIVITIES: CPT

## 2024-10-15 PROCEDURE — 97140 MANUAL THERAPY 1/> REGIONS: CPT

## 2024-10-15 PROCEDURE — 97110 THERAPEUTIC EXERCISES: CPT

## 2024-10-15 NOTE — FLOWSHEET NOTE
activities related to strengthening, flexibility, endurance, ROM performed to prevent loss of range of motion, maintain or improve muscular strength or increase flexibility, following either an injury or surgery.  (17274) MANUAL THERAPY -  Manual therapy techniques, 1 or more regions, each 15 minutes (Mobilization/manipulation, manual lymphatic drainage, manual traction) for the purpose of modulating pain, promoting relaxation,  increasing ROM, reducing/eliminating soft tissue swelling/inflammation/restriction, improving soft tissue extensibility and allowing for proper ROM for normal function with self care, mobility, lifting and ambulation  (25856) Needle insertion(s) without injection; 1 or 2 muscle(s).  (90079) ATTENDED ESTIM. Application of a modality to 1 or more areas; electrical stimulation (manual), each 15 minutes. Attended electrical stimulation requires direct (1-on-1) contact with the patient by the qualified professional/qualified personnel in providing electrical stimulation manually through the use of probes or other devices.    GOALS     Patient stated goal: return to work as   [] Progressing: [] Met: [] Not Met: [] Adjusted    Therapist goals for Patient:   Short Term Goals: To be achieved in: 2 weeks  1. Independent in HEP and progression per patient tolerance, in order to prevent re-injury.   [] Progressing: [] Met: [] Not Met: [] Adjusted  2. Patient will have a decrease in pain to <3/10 to facilitate improvement in movement, function, and ADLs as indicated by Functional Deficits.  [] Progressing: [] Met: [] Not Met: [] Adjusted    Long Term Goals: To be achieved in: 12 weeks  1. Disability index score of 25% or less for the WOMAC to assist with reaching prior level of function with activities such as don/doff socks and shoes.  [] Progressing: [] Met: [] Not Met: [] Adjusted  2. Patient will demonstrate increased AROM of L knee extension/flexion to 0-120 without pain to allow for

## 2024-10-15 NOTE — TELEPHONE ENCOUNTER
General Question     Subject: MESSAGE FOR [facility]  Patient and /or Facility Request: Yandy Garcia   Contact Number:   [phone]     THE PT WOULD LIKE [facility] TO CALL HER, SHE WOULDN'T SAY WHAT IT'S ABOUT.

## 2024-10-17 NOTE — TELEPHONE ENCOUNTER
MELISSA CALLED FARZANA BACK AND NEEDS TO ASK ABOUT A HEARING SHE HAS TO GO TO FOR WC. PLEASE CALL  HER BACK AT [phone]

## 2024-10-17 NOTE — TELEPHONE ENCOUNTER
Called patient spoke with her about the OP note. The Op note shows that he did the majority of the lateral meniscus and a small bit of the medial meniscus

## 2024-10-17 NOTE — TELEPHONE ENCOUNTER
Spoke with patient about her hearing coming up - she states that it was only her lateral meniscus however Dr. Rowley's note states both medial and lateral. All other supporting documentation supports lateral meniscectomy. I have given her our medical records number to have them give her any medical records that is needed for her hearing. It may be a case of mis-documentation but I will confirm with Dr. Rowley.

## 2024-10-30 ENCOUNTER — HOSPITAL ENCOUNTER (OUTPATIENT)
Dept: PHYSICAL THERAPY | Age: 49
Setting detail: THERAPIES SERIES
Discharge: HOME | End: 2024-10-30
Payer: COMMERCIAL

## 2024-10-30 PROCEDURE — 97140 MANUAL THERAPY 1/> REGIONS: CPT

## 2024-10-30 PROCEDURE — 97110 THERAPEUTIC EXERCISES: CPT

## 2024-10-30 PROCEDURE — 97530 THERAPEUTIC ACTIVITIES: CPT

## 2024-10-30 NOTE — FLOWSHEET NOTE
[facility] - Outpatient Rehabilitation and Therapy [address], [place] [address] office: [phone] fax: [phone]     Physical Therapy: TREATMENT/PROGRESS NOTE   Patient: Yandy Garcia (49 y.o. female)   Examination Date: 10/30/2024   :  1975 MRN: [id number]   Visit #: 27   Insurance Allowable Auth Needed   [facility] 18 through  []Yes    []No    Insurance: Payor: [employer] / Plan: [employer] / Product Type: *No Product type* /   Insurance ID: [id number] - (Worker's Comp)  Secondary Insurance (if applicable):    Treatment Diagnosis:     ICD-10-CM    1. Acute postoperative pain of left knee  G89.18     M25.562       2. Quadriceps weakness  M62.81       3. Difficulty walking  R26.2       4. Effusion of left knee  M25.462          Medical Diagnosis:  Acute lateral meniscus tear of left knee, subsequent encounter [S83.282D]   Referring Physician: Stefano Rowley, *  PCP: Lisa Ribeiro MD     Plan of care signed (Y/N): Y    Date of Patient follow up with Physician:      Progress Report/POC: NO  POC update due: (10 visits /OR AUTH LIMITS, whichever is less)  date extension to 10/30                                            Precautions/ Contra-indications:           Latex allergy:  YES  Pacemaker:    NO  Contraindications for Manipulation: recent surgical history (relative)  Date of Surgery: 24 L knee partial lateral menisectomy   Other:    Red Flags:  None    C-SSRS Triggered by Intake questionnaire:   Patient answered 'NO' to both behavioral questions on intake.  No further screening warranted    Preferred Language for Healthcare:   [x] English       [] other:    SUBJECTIVE EXAMINATION     Patient stated complaint: Notes she was able to climb a 30 foot ladder with minimal difficulty, did transition to a step to pattern at the top.        Test used Initial score  6/18/24 10/30/2024   Pain Summary VAS 1-6 0 rest   Functional questionnaire WOMAC 48/96  50%

## 2024-11-05 ENCOUNTER — TELEPHONE (OUTPATIENT)
Age: 49
End: 2024-11-05

## 2024-11-05 NOTE — TELEPHONE ENCOUNTER
Called patient, spoke with her about how in the OP note it does state that Dr. Rowley did a medial meniscectomy during her WC procedure showd her wear in the note it was and how the Radiology did not note the medical meniscus tear but did note the lateral meniscus tear.

## (undated) DEVICE — BLADE SHAVER AGGRESSIVE + 4 MM RAZOR SHRP TOOTH RED SYNOVIUM

## (undated) DEVICE — SET IRRIG L94IN ID0.281IN W/ 4.5IN DST FLX CONN 2 LD ON OFF

## (undated) DEVICE — COVER,MAYO STAND,XL,STERILE: Brand: MEDLINE

## (undated) DEVICE — DYONICS 4.0 MM ELITE                                    ACROMIOBLASTER STRAIGHT DISPOSABLE                                    BURRS, SAGE GREEN, 10000 MAXIMUM                                    RPM, PACKAGED 6 PER BOX, STERILE

## (undated) DEVICE — DYONICS 3.5 MM INCISOR PLUS ELITE                                    STRAIGHT DISPOSABLE BLADES,                                    POWER/EP-1, WHITE, PACKAGED 6 PER                                    BOX, STERILE

## (undated) DEVICE — SPONGE LAP W18XL18IN WHT COT 4 PLY FLD STRUNG RADPQ DISP ST 2 PER PACK

## (undated) DEVICE — GLOVE ORANGE PI 8 1/2   MSG9085

## (undated) DEVICE — WEREWOLF FLOW 50 COBLATION WAND: Brand: COBLATION

## (undated) DEVICE — MERCY HEALTH WEST TURNOVER: Brand: MEDLINE INDUSTRIES, INC.

## (undated) DEVICE — 3M™ STERI-STRIP™ REINFORCED ADHESIVE SKIN CLOSURES, R1547, 1/2 IN X 4 IN (12 MM X 100 MM), 6 STRIPS/ENVELOPE: Brand: 3M™ STERI-STRIP™

## (undated) DEVICE — GOWN,AURORA,NONREINF,RAGLAN,XXL,STERILE: Brand: MEDLINE

## (undated) DEVICE — ZIMMER® STERILE DISPOSABLE TOURNIQUET CUFF WITH PLC, DUAL PORT, SINGLE BLADDER, 42 IN. (107 CM)

## (undated) DEVICE — SHEET,DRAPE,53X77,STERILE: Brand: MEDLINE

## (undated) DEVICE — LEGGINGS, PAIR, 31X48, STERILE: Brand: MEDLINE

## (undated) DEVICE — BASIC SINGLE BASIN 1-LF: Brand: MEDLINE INDUSTRIES, INC.

## (undated) DEVICE — TUBING, SUCTION, 1/4" X 10', STRAIGHT: Brand: MEDLINE

## (undated) DEVICE — DEVON TUBE HOLDER REMOVABLE TOUCH FASTEN STRAP: Brand: DEVON

## (undated) DEVICE — PAD ABSRB W8XL10IN ABD HYDROPHOBIC NONWOVEN THCK LAYR CELOS

## (undated) DEVICE — APPLICATOR MEDICATED 26 CC SOLUTION HI LT ORNG CHLORAPREP

## (undated) DEVICE — 4-PORT MANIFOLD: Brand: NEPTUNE 2

## (undated) DEVICE — COVER,MAYO STAND,STERILE: Brand: MEDLINE

## (undated) DEVICE — NEEDLE SPNL L3.5IN PNK HUB S STL REG WALL FIT STYL W/ QNCKE

## (undated) DEVICE — SUTURE MONOCRYL + SZ 4-0 L18IN ABSRB UD L19MM PS-2 3/8 CIR MCP496G

## (undated) DEVICE — Device

## (undated) DEVICE — LIGHT HANDLE: Brand: DEVON